# Patient Record
Sex: MALE | Race: WHITE | NOT HISPANIC OR LATINO | Employment: OTHER | URBAN - NONMETROPOLITAN AREA
[De-identification: names, ages, dates, MRNs, and addresses within clinical notes are randomized per-mention and may not be internally consistent; named-entity substitution may affect disease eponyms.]

---

## 2017-01-01 ENCOUNTER — TELEPHONE (OUTPATIENT)
Dept: ONCOLOGY | Facility: CLINIC | Age: 82
End: 2017-01-01

## 2017-01-01 ENCOUNTER — APPOINTMENT (OUTPATIENT)
Dept: GENERAL RADIOLOGY | Facility: HOSPITAL | Age: 82
End: 2017-01-01

## 2017-01-01 ENCOUNTER — TRANSCRIBE ORDERS (OUTPATIENT)
Dept: ADMINISTRATIVE | Facility: HOSPITAL | Age: 82
End: 2017-01-01

## 2017-01-01 ENCOUNTER — LAB REQUISITION (OUTPATIENT)
Dept: LAB | Facility: HOSPITAL | Age: 82
End: 2017-01-01

## 2017-01-01 ENCOUNTER — HOSPITAL ENCOUNTER (OUTPATIENT)
Dept: GENERAL RADIOLOGY | Facility: HOSPITAL | Age: 82
Discharge: HOME | End: 2017-08-01
Attending: INTERNAL MEDICINE | Admitting: INTERNAL MEDICINE

## 2017-01-01 ENCOUNTER — ANESTHESIA EVENT (OUTPATIENT)
Dept: PERIOP | Facility: HOSPITAL | Age: 82
End: 2017-01-01

## 2017-01-01 ENCOUNTER — HOSPITAL ENCOUNTER (EMERGENCY)
Facility: HOSPITAL | Age: 82
Discharge: HOME | End: 2017-07-22
Attending: EMERGENCY MEDICINE | Admitting: EMERGENCY MEDICINE

## 2017-01-01 ENCOUNTER — HOSPITAL ENCOUNTER (INPATIENT)
Facility: HOSPITAL | Age: 82
LOS: 7 days | Discharge: HOME HEALTH | End: 2017-07-07
Attending: EMERGENCY MEDICINE | Admitting: SURGERY

## 2017-01-01 ENCOUNTER — TRANSCRIBE ORDERS (OUTPATIENT)
Dept: LAB | Facility: HOSPITAL | Age: 82
End: 2017-01-01

## 2017-01-01 ENCOUNTER — LAB (OUTPATIENT)
Dept: LAB | Facility: HOSPITAL | Age: 82
End: 2017-01-01

## 2017-01-01 ENCOUNTER — HOSPITAL ENCOUNTER (OUTPATIENT)
Dept: GENERAL RADIOLOGY | Facility: HOSPITAL | Age: 82
Discharge: HOME | End: 2017-06-29
Attending: INTERNAL MEDICINE | Admitting: INTERNAL MEDICINE

## 2017-01-01 ENCOUNTER — OFFICE VISIT (OUTPATIENT)
Dept: ONCOLOGY | Facility: CLINIC | Age: 82
End: 2017-01-01

## 2017-01-01 ENCOUNTER — ANESTHESIA (OUTPATIENT)
Dept: PERIOP | Facility: HOSPITAL | Age: 82
End: 2017-01-01

## 2017-01-01 ENCOUNTER — HOSPITAL ENCOUNTER (OUTPATIENT)
Dept: GENERAL RADIOLOGY | Facility: HOSPITAL | Age: 82
Discharge: HOME | End: 2017-07-10
Attending: INTERNAL MEDICINE | Admitting: INTERNAL MEDICINE

## 2017-01-01 ENCOUNTER — APPOINTMENT (OUTPATIENT)
Dept: CT IMAGING | Facility: HOSPITAL | Age: 82
End: 2017-01-01

## 2017-01-01 ENCOUNTER — INFUSION (OUTPATIENT)
Dept: ONCOLOGY | Facility: HOSPITAL | Age: 82
End: 2017-01-01

## 2017-01-01 VITALS
OXYGEN SATURATION: 100 % | TEMPERATURE: 97.7 F | SYSTOLIC BLOOD PRESSURE: 121 MMHG | HEIGHT: 70 IN | HEART RATE: 74 BPM | DIASTOLIC BLOOD PRESSURE: 74 MMHG | WEIGHT: 180 LBS | RESPIRATION RATE: 16 BRPM | BODY MASS INDEX: 25.77 KG/M2

## 2017-01-01 VITALS
TEMPERATURE: 98.3 F | DIASTOLIC BLOOD PRESSURE: 61 MMHG | SYSTOLIC BLOOD PRESSURE: 116 MMHG | WEIGHT: 185 LBS | HEART RATE: 80 BPM | OXYGEN SATURATION: 98 % | BODY MASS INDEX: 25.06 KG/M2 | RESPIRATION RATE: 18 BRPM | HEIGHT: 72 IN

## 2017-01-01 VITALS
SYSTOLIC BLOOD PRESSURE: 124 MMHG | HEART RATE: 88 BPM | TEMPERATURE: 97.3 F | BODY MASS INDEX: 25.77 KG/M2 | WEIGHT: 180 LBS | DIASTOLIC BLOOD PRESSURE: 60 MMHG | HEIGHT: 70 IN | RESPIRATION RATE: 20 BRPM

## 2017-01-01 VITALS
SYSTOLIC BLOOD PRESSURE: 119 MMHG | TEMPERATURE: 98.7 F | RESPIRATION RATE: 16 BRPM | HEART RATE: 70 BPM | BODY MASS INDEX: 25.87 KG/M2 | HEIGHT: 72 IN | DIASTOLIC BLOOD PRESSURE: 59 MMHG | WEIGHT: 191 LBS

## 2017-01-01 VITALS
RESPIRATION RATE: 13 BRPM | HEART RATE: 68 BPM | SYSTOLIC BLOOD PRESSURE: 104 MMHG | WEIGHT: 192 LBS | BODY MASS INDEX: 27.55 KG/M2 | DIASTOLIC BLOOD PRESSURE: 56 MMHG | TEMPERATURE: 97.8 F

## 2017-01-01 VITALS
WEIGHT: 179 LBS | SYSTOLIC BLOOD PRESSURE: 106 MMHG | HEART RATE: 101 BPM | HEIGHT: 72 IN | RESPIRATION RATE: 16 BRPM | BODY MASS INDEX: 24.24 KG/M2 | TEMPERATURE: 98 F | DIASTOLIC BLOOD PRESSURE: 64 MMHG

## 2017-01-01 VITALS
HEART RATE: 69 BPM | RESPIRATION RATE: 16 BRPM | SYSTOLIC BLOOD PRESSURE: 134 MMHG | WEIGHT: 189 LBS | DIASTOLIC BLOOD PRESSURE: 60 MMHG | BODY MASS INDEX: 25.6 KG/M2 | HEIGHT: 72 IN | TEMPERATURE: 97.1 F

## 2017-01-01 DIAGNOSIS — D75.81 MYELOFIBROSIS (HCC): ICD-10-CM

## 2017-01-01 DIAGNOSIS — D75.81 MYELOFIBROSIS (HCC): Primary | ICD-10-CM

## 2017-01-01 DIAGNOSIS — D72.829 LEUKOCYTOSIS, UNSPECIFIED TYPE: Primary | ICD-10-CM

## 2017-01-01 DIAGNOSIS — R33.9 URINARY RETENTION: Primary | ICD-10-CM

## 2017-01-01 DIAGNOSIS — R14.0 ABDOMINAL DISTENTION: Primary | ICD-10-CM

## 2017-01-01 DIAGNOSIS — R11.2 NAUSEA AND VOMITING, INTRACTABILITY OF VOMITING NOT SPECIFIED, UNSPECIFIED VOMITING TYPE: Primary | ICD-10-CM

## 2017-01-01 DIAGNOSIS — R82.81 PYURIA: ICD-10-CM

## 2017-01-01 DIAGNOSIS — R10.9 ABDOMINAL PAIN, UNSPECIFIED LOCATION: ICD-10-CM

## 2017-01-01 DIAGNOSIS — K56.609 SBO (SMALL BOWEL OBSTRUCTION) (HCC): Primary | ICD-10-CM

## 2017-01-01 DIAGNOSIS — D72.819 LEUKOPENIA, UNSPECIFIED TYPE: ICD-10-CM

## 2017-01-01 DIAGNOSIS — D72.829 LEUKOCYTOSIS, UNSPECIFIED TYPE: ICD-10-CM

## 2017-01-01 DIAGNOSIS — D75.839 THROMBOCYTOSIS: ICD-10-CM

## 2017-01-01 DIAGNOSIS — Z74.09 IMPAIRED FUNCTIONAL MOBILITY, BALANCE, GAIT, AND ENDURANCE: ICD-10-CM

## 2017-01-01 DIAGNOSIS — K55.029 ISCHEMIC NECROSIS OF SMALL BOWEL (HCC): ICD-10-CM

## 2017-01-01 DIAGNOSIS — R11.14 BILIOUS VOMITING WITHOUT NAUSEA: ICD-10-CM

## 2017-01-01 LAB
ABO GROUP BLD: NORMAL
ALBUMIN SERPL-MCNC: 3.3 G/DL (ref 3.2–4.8)
ALBUMIN SERPL-MCNC: 3.6 G/DL (ref 3.2–4.8)
ALBUMIN SERPL-MCNC: 3.7 G/DL (ref 3.2–4.8)
ALBUMIN SERPL-MCNC: 3.7 G/DL (ref 3.2–4.8)
ALBUMIN SERPL-MCNC: 4.9 G/DL (ref 3.2–4.8)
ALBUMIN/GLOB SERPL: 1.7 G/DL (ref 1.5–2.5)
ALBUMIN/GLOB SERPL: 1.8 G/DL (ref 1.5–2.5)
ALBUMIN/GLOB SERPL: 1.9 G/DL (ref 1.5–2.5)
ALBUMIN/GLOB SERPL: 1.9 G/DL (ref 1.5–2.5)
ALP SERPL-CCNC: 32 U/L (ref 25–100)
ALP SERPL-CCNC: 36 U/L (ref 25–100)
ALP SERPL-CCNC: 36 U/L (ref 25–100)
ALP SERPL-CCNC: 41 U/L (ref 25–100)
ALP SERPL-CCNC: 61 U/L (ref 25–100)
ALP SERPL-CCNC: 61 U/L (ref 25–100)
ALT SERPL W P-5'-P-CCNC: 11 U/L (ref 7–40)
ALT SERPL W P-5'-P-CCNC: 20 U/L (ref 7–40)
ALT SERPL W P-5'-P-CCNC: 22 U/L (ref 7–40)
ALT SERPL W P-5'-P-CCNC: 22 U/L (ref 7–40)
ALT SERPL W P-5'-P-CCNC: 25 U/L (ref 7–40)
ALT SERPL W P-5'-P-CCNC: 27 U/L (ref 7–40)
ANION GAP SERPL CALCULATED.3IONS-SCNC: 11 MMOL/L (ref 3–11)
ANION GAP SERPL CALCULATED.3IONS-SCNC: 15 MMOL/L (ref 3–11)
ANION GAP SERPL CALCULATED.3IONS-SCNC: 6 MMOL/L (ref 3–11)
ANION GAP SERPL CALCULATED.3IONS-SCNC: 7 MMOL/L (ref 3–11)
ANION GAP SERPL CALCULATED.3IONS-SCNC: 7 MMOL/L (ref 3–11)
ANION GAP SERPL CALCULATED.3IONS-SCNC: 8 MMOL/L (ref 3–11)
ANION GAP SERPL CALCULATED.3IONS-SCNC: 8 MMOL/L (ref 3–11)
APTT PPP: 35.7 SECONDS (ref 24–31)
AST SERPL-CCNC: 16 U/L (ref 0–33)
AST SERPL-CCNC: 20 U/L (ref 0–33)
AST SERPL-CCNC: 22 U/L (ref 0–33)
AST SERPL-CCNC: 22 U/L (ref 0–33)
AST SERPL-CCNC: 26 U/L (ref 0–33)
AST SERPL-CCNC: 29 U/L (ref 0–33)
AST SERPL-CCNC: 29 U/L (ref 0–33)
AST SERPL-CCNC: 32 U/L (ref 0–33)
AST SERPL-CCNC: 34 U/L (ref 0–33)
BACTERIA SPEC AEROBE CULT: NORMAL
BACTERIA UR QL AUTO: ABNORMAL /HPF
BASOPHILS # BLD AUTO: 0.03 10*3/MM3 (ref 0–0.2)
BASOPHILS # BLD AUTO: 0.03 10*3/MM3 (ref 0–0.2)
BASOPHILS # BLD AUTO: 0.13 10*3/MM3 (ref 0–0.2)
BASOPHILS # BLD MANUAL: 0 10*3/MM3 (ref 0–0.2)
BASOPHILS NFR BLD AUTO: 0 % (ref 0–1)
BASOPHILS NFR BLD AUTO: 0.2 % (ref 0–1)
BASOPHILS NFR BLD AUTO: 0.2 % (ref 0–1)
BASOPHILS NFR BLD AUTO: 0.3 % (ref 0–1)
BILIRUB SERPL-MCNC: 0.4 MG/DL (ref 0.3–1.2)
BILIRUB SERPL-MCNC: 0.6 MG/DL (ref 0.3–1.2)
BILIRUB SERPL-MCNC: 0.7 MG/DL (ref 0.3–1.2)
BILIRUB SERPL-MCNC: 0.8 MG/DL (ref 0.3–1.2)
BILIRUB UR QL STRIP: ABNORMAL
BLASTS NFR BLD MANUAL: 2 % (ref 0–0)
BLASTS NFR BLD MANUAL: 2 % (ref 0–0)
BLD GP AB SCN SERPL QL: NEGATIVE
BUN BLD-MCNC: 36 MG/DL (ref 9–23)
BUN BLD-MCNC: 37 MG/DL (ref 9–23)
BUN BLD-MCNC: 40 MG/DL (ref 9–23)
BUN BLD-MCNC: 42 MG/DL (ref 9–23)
BUN BLD-MCNC: 42 MG/DL (ref 9–23)
BUN BLD-MCNC: 50 MG/DL (ref 9–23)
BUN BLD-MCNC: 52 MG/DL (ref 9–23)
BUN BLD-MCNC: 58 MG/DL (ref 9–23)
BUN/CREAT SERPL: 16.7 (ref 7–25)
BUN/CREAT SERPL: 20.7 (ref 7–25)
BUN/CREAT SERPL: 21.7 (ref 7–25)
BUN/CREAT SERPL: 22.1 (ref 7–25)
BUN/CREAT SERPL: 25.7 (ref 7–25)
BUN/CREAT SERPL: 30 (ref 7–25)
BUN/CREAT SERPL: 30.8 (ref 7–25)
BUN/CREAT SERPL: 30.8 (ref 7–25)
CA-I SERPL ISE-MCNC: 1.08 MMOL/L (ref 1.12–1.32)
CA-I SERPL ISE-MCNC: 1.23 MMOL/L (ref 1.12–1.32)
CA-I SERPL ISE-MCNC: 1.24 MMOL/L (ref 1.12–1.32)
CALCIUM SPEC-SCNC: 10.9 MG/DL (ref 8.7–10.4)
CALCIUM SPEC-SCNC: 9 MG/DL (ref 8.7–10.4)
CALCIUM SPEC-SCNC: 9.1 MG/DL (ref 8.7–10.4)
CALCIUM SPEC-SCNC: 9.2 MG/DL (ref 8.7–10.4)
CALCIUM SPEC-SCNC: 9.7 MG/DL (ref 8.7–10.4)
CALCIUM SPEC-SCNC: 9.8 MG/DL (ref 8.7–10.4)
CHLORIDE SERPL-SCNC: 100 MMOL/L (ref 99–109)
CHLORIDE SERPL-SCNC: 101 MMOL/L (ref 99–109)
CHLORIDE SERPL-SCNC: 103 MMOL/L (ref 99–109)
CHLORIDE SERPL-SCNC: 103 MMOL/L (ref 99–109)
CHLORIDE SERPL-SCNC: 105 MMOL/L (ref 99–109)
CHLORIDE SERPL-SCNC: 105 MMOL/L (ref 99–109)
CHLORIDE SERPL-SCNC: 106 MMOL/L (ref 99–109)
CHLORIDE SERPL-SCNC: 107 MMOL/L (ref 99–109)
CHLORIDE SERPL-SCNC: 108 MMOL/L (ref 99–109)
CHLORIDE SERPL-SCNC: 89 MMOL/L (ref 99–109)
CHOLEST SERPL-MCNC: 46 MG/DL (ref 0–200)
CHOLEST SERPL-MCNC: 48 MG/DL (ref 0–200)
CHOLEST SERPL-MCNC: 50 MG/DL (ref 0–200)
CLARITY UR: ABNORMAL
CO2 SERPL-SCNC: 22 MMOL/L (ref 20–31)
CO2 SERPL-SCNC: 23 MMOL/L (ref 20–31)
CO2 SERPL-SCNC: 23 MMOL/L (ref 20–31)
CO2 SERPL-SCNC: 27 MMOL/L (ref 20–31)
CO2 SERPL-SCNC: 27 MMOL/L (ref 20–31)
CO2 SERPL-SCNC: 28 MMOL/L (ref 20–31)
CO2 SERPL-SCNC: 28 MMOL/L (ref 20–31)
CO2 SERPL-SCNC: 30 MMOL/L (ref 20–31)
CO2 SERPL-SCNC: 31 MMOL/L (ref 20–31)
CO2 SERPL-SCNC: 32 MMOL/L (ref 20–31)
COLOR UR: ABNORMAL
CREAT BLD-MCNC: 1.2 MG/DL (ref 0.6–1.3)
CREAT BLD-MCNC: 1.2 MG/DL (ref 0.6–1.3)
CREAT BLD-MCNC: 1.3 MG/DL (ref 0.6–1.3)
CREAT BLD-MCNC: 1.4 MG/DL (ref 0.6–1.3)
CREAT BLD-MCNC: 1.4 MG/DL (ref 0.6–1.3)
CREAT BLD-MCNC: 1.9 MG/DL (ref 0.6–1.3)
CREAT BLD-MCNC: 1.9 MG/DL (ref 0.6–1.3)
CREAT BLD-MCNC: 2.4 MG/DL (ref 0.6–1.3)
CREAT BLD-MCNC: 2.8 MG/DL (ref 0.6–1.3)
CREAT BLD-MCNC: 3 MG/DL (ref 0.6–1.3)
CRP SERPL-MCNC: 10.45 MG/DL (ref 0–1)
CRP SERPL-MCNC: 10.99 MG/DL (ref 0–1)
CYTO UR: NORMAL
CYTOLOGIST CVX/VAG CYTO: NORMAL
D-LACTATE SERPL-SCNC: 1.4 MMOL/L (ref 0.5–2)
DEPRECATED RDW RBC AUTO: 56.8 FL (ref 37–54)
DEPRECATED RDW RBC AUTO: 56.8 FL (ref 37–54)
DEPRECATED RDW RBC AUTO: 57.1 FL (ref 37–54)
DEPRECATED RDW RBC AUTO: 57.1 FL (ref 37–54)
DEPRECATED RDW RBC AUTO: 57.2 FL (ref 37–54)
DEPRECATED RDW RBC AUTO: 57.7 FL (ref 37–54)
DEPRECATED RDW RBC AUTO: 58.7 FL (ref 37–54)
DEPRECATED RDW RBC AUTO: 59 FL (ref 37–54)
DEPRECATED RDW RBC AUTO: 64.2 FL (ref 37–54)
EOSINOPHIL # BLD AUTO: 0.01 10*3/MM3 (ref 0–0.3)
EOSINOPHIL # BLD AUTO: 0.03 10*3/MM3 (ref 0–0.3)
EOSINOPHIL # BLD AUTO: 0.05 10*3/MM3 (ref 0–0.3)
EOSINOPHIL # BLD MANUAL: 0 10*3/MM3 (ref 0.1–0.3)
EOSINOPHIL NFR BLD AUTO: 0.1 % (ref 0–3)
EOSINOPHIL NFR BLD AUTO: 0.1 % (ref 0–3)
EOSINOPHIL NFR BLD AUTO: 0.2 % (ref 0–3)
EOSINOPHIL NFR BLD MANUAL: 0 % (ref 0–3)
ERYTHROCYTE [DISTWIDTH] IN BLOOD BY AUTOMATED COUNT: 17.6 % (ref 11.3–14.5)
ERYTHROCYTE [DISTWIDTH] IN BLOOD BY AUTOMATED COUNT: 17.7 % (ref 11.3–14.5)
ERYTHROCYTE [DISTWIDTH] IN BLOOD BY AUTOMATED COUNT: 17.8 % (ref 11.3–14.5)
ERYTHROCYTE [DISTWIDTH] IN BLOOD BY AUTOMATED COUNT: 17.8 % (ref 11.3–14.5)
ERYTHROCYTE [DISTWIDTH] IN BLOOD BY AUTOMATED COUNT: 18 % (ref 11.3–14.5)
ERYTHROCYTE [DISTWIDTH] IN BLOOD BY AUTOMATED COUNT: 18.1 % (ref 11.3–14.5)
ERYTHROCYTE [DISTWIDTH] IN BLOOD BY AUTOMATED COUNT: 19.1 % (ref 11.3–14.5)
FINE GRAN CASTS URNS QL MICRO: ABNORMAL /LPF
GFR SERPL CREATININE-BSD FRML MDRD: 20 ML/MIN/1.73
GFR SERPL CREATININE-BSD FRML MDRD: 22 ML/MIN/1.73
GFR SERPL CREATININE-BSD FRML MDRD: 26 ML/MIN/1.73
GFR SERPL CREATININE-BSD FRML MDRD: 34 ML/MIN/1.73
GFR SERPL CREATININE-BSD FRML MDRD: 48 ML/MIN/1.73
GFR SERPL CREATININE-BSD FRML MDRD: 52 ML/MIN/1.73
GFR SERPL CREATININE-BSD FRML MDRD: 57 ML/MIN/1.73
GFR SERPL CREATININE-BSD FRML MDRD: 57 ML/MIN/1.73
GLOBULIN UR ELPH-MCNC: 1.9 GM/DL
GLOBULIN UR ELPH-MCNC: 1.9 GM/DL
GLOBULIN UR ELPH-MCNC: 2 GM/DL
GLOBULIN UR ELPH-MCNC: 2.8 GM/DL
GLUCOSE BLD-MCNC: 104 MG/DL (ref 70–100)
GLUCOSE BLD-MCNC: 112 MG/DL (ref 70–100)
GLUCOSE BLD-MCNC: 114 MG/DL (ref 70–100)
GLUCOSE BLD-MCNC: 117 MG/DL (ref 70–100)
GLUCOSE BLD-MCNC: 120 MG/DL (ref 70–100)
GLUCOSE BLD-MCNC: 120 MG/DL (ref 70–100)
GLUCOSE BLD-MCNC: 145 MG/DL (ref 70–100)
GLUCOSE BLD-MCNC: 174 MG/DL (ref 70–100)
GLUCOSE BLDC GLUCOMTR-MCNC: 104 MG/DL (ref 70–130)
GLUCOSE BLDC GLUCOMTR-MCNC: 108 MG/DL (ref 70–130)
GLUCOSE BLDC GLUCOMTR-MCNC: 110 MG/DL (ref 70–130)
GLUCOSE BLDC GLUCOMTR-MCNC: 110 MG/DL (ref 70–130)
GLUCOSE BLDC GLUCOMTR-MCNC: 112 MG/DL (ref 70–130)
GLUCOSE BLDC GLUCOMTR-MCNC: 113 MG/DL (ref 70–130)
GLUCOSE BLDC GLUCOMTR-MCNC: 114 MG/DL (ref 70–130)
GLUCOSE BLDC GLUCOMTR-MCNC: 115 MG/DL (ref 70–130)
GLUCOSE BLDC GLUCOMTR-MCNC: 117 MG/DL (ref 70–130)
GLUCOSE BLDC GLUCOMTR-MCNC: 121 MG/DL (ref 70–130)
GLUCOSE BLDC GLUCOMTR-MCNC: 122 MG/DL (ref 70–130)
GLUCOSE BLDC GLUCOMTR-MCNC: 123 MG/DL (ref 70–130)
GLUCOSE BLDC GLUCOMTR-MCNC: 132 MG/DL (ref 70–130)
GLUCOSE BLDC GLUCOMTR-MCNC: 137 MG/DL (ref 70–130)
GLUCOSE BLDC GLUCOMTR-MCNC: 137 MG/DL (ref 70–130)
GLUCOSE BLDC GLUCOMTR-MCNC: 138 MG/DL (ref 70–130)
GLUCOSE BLDC GLUCOMTR-MCNC: 158 MG/DL (ref 70–130)
GLUCOSE BLDC GLUCOMTR-MCNC: 75 MG/DL (ref 70–130)
GLUCOSE BLDC GLUCOMTR-MCNC: 87 MG/DL (ref 70–130)
GLUCOSE BLDC GLUCOMTR-MCNC: 91 MG/DL (ref 70–130)
GLUCOSE BLDC GLUCOMTR-MCNC: 94 MG/DL (ref 70–130)
GLUCOSE BLDC GLUCOMTR-MCNC: 98 MG/DL (ref 70–130)
GLUCOSE UR STRIP-MCNC: NEGATIVE MG/DL
HBA1C MFR BLD: 5.7 % (ref 4.8–5.6)
HCT VFR BLD AUTO: 33.8 % (ref 38.9–50.9)
HCT VFR BLD AUTO: 34 % (ref 38.9–50.9)
HCT VFR BLD AUTO: 34.8 % (ref 38.9–50.9)
HCT VFR BLD AUTO: 35.2 % (ref 38.9–50.9)
HCT VFR BLD AUTO: 36.4 % (ref 38.9–50.9)
HCT VFR BLD AUTO: 36.8 % (ref 38.9–50.9)
HCT VFR BLD AUTO: 36.8 % (ref 38.9–50.9)
HCT VFR BLD AUTO: 39.5 % (ref 38.9–50.9)
HCT VFR BLD AUTO: 47 % (ref 38.9–50.9)
HGB BLD-MCNC: 11.2 G/DL (ref 13.1–17.5)
HGB BLD-MCNC: 11.2 G/DL (ref 13.1–17.5)
HGB BLD-MCNC: 11.8 G/DL (ref 13.1–17.5)
HGB BLD-MCNC: 11.8 G/DL (ref 13.1–17.5)
HGB BLD-MCNC: 12.3 G/DL (ref 13.1–17.5)
HGB BLD-MCNC: 12.4 G/DL (ref 13.1–17.5)
HGB BLD-MCNC: 12.4 G/DL (ref 13.1–17.5)
HGB BLD-MCNC: 12.9 G/DL (ref 13.1–17.5)
HGB BLD-MCNC: 16.4 G/DL (ref 13.1–17.5)
HGB UR QL STRIP.AUTO: ABNORMAL
HOLD SPECIMEN: NORMAL
HOLD SPECIMEN: NORMAL
HYALINE CASTS UR QL AUTO: ABNORMAL /LPF
IMM GRANULOCYTES # BLD: 10.69 10*3/MM3 (ref 0–0.03)
IMM GRANULOCYTES # BLD: 2.28 10*3/MM3 (ref 0–0.03)
IMM GRANULOCYTES # BLD: 3.12 10*3/MM3 (ref 0–0.03)
IMM GRANULOCYTES NFR BLD: 14.5 % (ref 0–0.6)
IMM GRANULOCYTES NFR BLD: 19.6 % (ref 0–0.6)
IMM GRANULOCYTES NFR BLD: 26 % (ref 0–0.6)
INR PPP: 1.21
KETONES UR QL STRIP: ABNORMAL
LAB AP CASE REPORT: NORMAL
LAB AP CLINICAL INFORMATION: NORMAL
LAB AP DIAGNOSIS COMMENT: NORMAL
LARGE PLATELETS: ABNORMAL
LARGE PLATELETS: ABNORMAL
LEUKOCYTE ESTERASE UR QL STRIP.AUTO: ABNORMAL
LIPASE SERPL-CCNC: 46 U/L (ref 6–51)
LYMPHOCYTES # BLD AUTO: 1.01 10*3/MM3 (ref 0.6–4.8)
LYMPHOCYTES # BLD AUTO: 1.08 10*3/MM3 (ref 0.6–4.8)
LYMPHOCYTES # BLD AUTO: 2.25 10*3/MM3 (ref 0.6–4.8)
LYMPHOCYTES # BLD MANUAL: 1.64 10*3/MM3 (ref 0.6–4.8)
LYMPHOCYTES # BLD MANUAL: 1.79 10*3/MM3 (ref 0.6–4.8)
LYMPHOCYTES # BLD MANUAL: 2.84 10*3/MM3 (ref 0.6–4.8)
LYMPHOCYTES # BLD MANUAL: 4.42 10*3/MM3 (ref 0.6–4.8)
LYMPHOCYTES # BLD MANUAL: 5.04 10*3/MM3 (ref 0.6–4.8)
LYMPHOCYTES NFR BLD AUTO: 5.5 % (ref 24–44)
LYMPHOCYTES NFR BLD AUTO: 6.3 % (ref 24–44)
LYMPHOCYTES NFR BLD AUTO: 6.9 % (ref 24–44)
LYMPHOCYTES NFR BLD MANUAL: 1 % (ref 0–12)
LYMPHOCYTES NFR BLD MANUAL: 1 % (ref 0–12)
LYMPHOCYTES NFR BLD MANUAL: 14 % (ref 24–44)
LYMPHOCYTES NFR BLD MANUAL: 3 % (ref 0–12)
LYMPHOCYTES NFR BLD MANUAL: 3 % (ref 24–44)
LYMPHOCYTES NFR BLD MANUAL: 4 % (ref 24–44)
LYMPHOCYTES NFR BLD MANUAL: 6 % (ref 0–12)
LYMPHOCYTES NFR BLD MANUAL: 7 % (ref 0–12)
LYMPHOCYTES NFR BLD MANUAL: 8 % (ref 24–44)
LYMPHOCYTES NFR BLD MANUAL: 9 % (ref 24–44)
Lab: NORMAL
MAGNESIUM SERPL-MCNC: 1.6 MG/DL (ref 1.3–2.7)
MAGNESIUM SERPL-MCNC: 1.7 MG/DL (ref 1.3–2.7)
MCH RBC QN AUTO: 30.1 PG (ref 27–31)
MCH RBC QN AUTO: 30.3 PG (ref 27–31)
MCH RBC QN AUTO: 30.6 PG (ref 27–31)
MCH RBC QN AUTO: 30.7 PG (ref 27–31)
MCH RBC QN AUTO: 30.8 PG (ref 27–31)
MCH RBC QN AUTO: 31.3 PG (ref 27–31)
MCH RBC QN AUTO: 31.5 PG (ref 27–31)
MCHC RBC AUTO-ENTMCNC: 32.7 G/DL (ref 32–36)
MCHC RBC AUTO-ENTMCNC: 32.9 G/DL (ref 32–36)
MCHC RBC AUTO-ENTMCNC: 33.1 G/DL (ref 32–36)
MCHC RBC AUTO-ENTMCNC: 33.5 G/DL (ref 32–36)
MCHC RBC AUTO-ENTMCNC: 33.7 G/DL (ref 32–36)
MCHC RBC AUTO-ENTMCNC: 33.7 G/DL (ref 32–36)
MCHC RBC AUTO-ENTMCNC: 33.8 G/DL (ref 32–36)
MCHC RBC AUTO-ENTMCNC: 33.9 G/DL (ref 32–36)
MCHC RBC AUTO-ENTMCNC: 34.9 G/DL (ref 32–36)
MCV RBC AUTO: 89.3 FL (ref 80–99)
MCV RBC AUTO: 90.2 FL (ref 80–99)
MCV RBC AUTO: 90.5 FL (ref 80–99)
MCV RBC AUTO: 90.8 FL (ref 80–99)
MCV RBC AUTO: 90.9 FL (ref 80–99)
MCV RBC AUTO: 91.4 FL (ref 80–99)
MCV RBC AUTO: 95.9 FL (ref 80–99)
METAMYELOCYTES NFR BLD MANUAL: 1 % (ref 0–0)
METAMYELOCYTES NFR BLD MANUAL: 11 % (ref 0–0)
METAMYELOCYTES NFR BLD MANUAL: 12 % (ref 0–0)
METAMYELOCYTES NFR BLD MANUAL: 2 % (ref 0–0)
METAMYELOCYTES NFR BLD MANUAL: 8 % (ref 0–0)
MONOCYTES # BLD AUTO: 0.32 10*3/MM3 (ref 0–1)
MONOCYTES # BLD AUTO: 0.63 10*3/MM3 (ref 0–1)
MONOCYTES # BLD AUTO: 0.95 10*3/MM3 (ref 0–1)
MONOCYTES # BLD AUTO: 2.17 10*3/MM3 (ref 0–1)
MONOCYTES # BLD AUTO: 2.63 10*3/MM3 (ref 0–1)
MONOCYTES # BLD AUTO: 2.87 10*3/MM3 (ref 0–1)
MONOCYTES # BLD AUTO: 3.35 10*3/MM3 (ref 0–1)
MONOCYTES # BLD AUTO: 3.58 10*3/MM3 (ref 0–1)
MONOCYTES NFR BLD AUTO: 13.6 % (ref 0–12)
MONOCYTES NFR BLD AUTO: 16.8 % (ref 0–12)
MONOCYTES NFR BLD AUTO: 8.2 % (ref 0–12)
MYELOCYTES NFR BLD MANUAL: 0 % (ref 0–0)
MYELOCYTES NFR BLD MANUAL: 3 % (ref 0–0)
MYELOCYTES NFR BLD MANUAL: 3 % (ref 0–0)
MYELOCYTES NFR BLD MANUAL: 5 % (ref 0–0)
NEUTROPHILS # BLD AUTO: 21.17 10*3/MM3 (ref 1.5–8.3)
NEUTROPHILS # BLD AUTO: 21.47 10*3/MM3 (ref 1.5–8.3)
NEUTROPHILS # BLD AUTO: 24.58 10*3/MM3 (ref 1.5–8.3)
NEUTROPHILS # BLD AUTO: 30.38 10*3/MM3 (ref 1.5–8.3)
NEUTROPHILS # BLD AUTO: 52.54 10*3/MM3 (ref 1.5–8.3)
NEUTROPHILS # BLD AUTO: 56.09 10*3/MM3 (ref 1.5–8.3)
NEUTROPHILS # BLD AUTO: 9.55 10*3/MM3 (ref 1.5–8.3)
NEUTROPHILS # BLD AUTO: 9.65 10*3/MM3 (ref 1.5–8.3)
NEUTROPHILS NFR BLD AUTO: 59.9 % (ref 41–71)
NEUTROPHILS NFR BLD AUTO: 60.1 % (ref 41–71)
NEUTROPHILS NFR BLD AUTO: 61.5 % (ref 41–71)
NEUTROPHILS NFR BLD MANUAL: 56 % (ref 41–71)
NEUTROPHILS NFR BLD MANUAL: 58 % (ref 41–71)
NEUTROPHILS NFR BLD MANUAL: 63 % (ref 41–71)
NEUTROPHILS NFR BLD MANUAL: 74 % (ref 41–71)
NEUTROPHILS NFR BLD MANUAL: 74 % (ref 41–71)
NEUTS BAND NFR BLD MANUAL: 10 % (ref 0–5)
NEUTS BAND NFR BLD MANUAL: 11 % (ref 0–5)
NEUTS BAND NFR BLD MANUAL: 11 % (ref 0–5)
NEUTS BAND NFR BLD MANUAL: 14 % (ref 0–5)
NEUTS BAND NFR BLD MANUAL: 15 % (ref 0–5)
NITRITE UR QL STRIP: NEGATIVE
NRBC BLD MANUAL-RTO: 0 /100 WBC (ref 0–0)
PATH INTERP BLD-IMP: NORMAL
PATH REPORT.FINAL DX SPEC: NORMAL
PATH REPORT.GROSS SPEC: NORMAL
PH UR STRIP.AUTO: <=5 [PH] (ref 5–8)
PHOSPHATE SERPL-MCNC: 2.3 MG/DL (ref 2.4–5.1)
PHOSPHATE SERPL-MCNC: 2.3 MG/DL (ref 2.4–5.1)
PHOSPHATE SERPL-MCNC: 2.4 MG/DL (ref 2.4–5.1)
PHOSPHATE SERPL-MCNC: 2.4 MG/DL (ref 2.4–5.1)
PHOSPHATE SERPL-MCNC: 2.5 MG/DL (ref 2.4–5.1)
PHOSPHATE SERPL-MCNC: 2.8 MG/DL (ref 2.4–5.1)
PHOSPHATE SERPL-MCNC: 2.8 MG/DL (ref 2.4–5.1)
PHOSPHATE SERPL-MCNC: 3.5 MG/DL (ref 2.4–5.1)
PHOSPHATE SERPL-MCNC: 3.8 MG/DL (ref 2.4–5.1)
PLAT MORPH BLD: NORMAL
PLATELET # BLD AUTO: 142 10*3/MM3 (ref 150–450)
PLATELET # BLD AUTO: 46 10*3/MM3 (ref 150–450)
PLATELET # BLD AUTO: 46 10*3/MM3 (ref 150–450)
PLATELET # BLD AUTO: 50 10*3/MM3 (ref 150–450)
PLATELET # BLD AUTO: 55 10*3/MM3 (ref 150–450)
PLATELET # BLD AUTO: 55 10*3/MM3 (ref 150–450)
PLATELET # BLD AUTO: 64 10*3/MM3 (ref 150–450)
PLATELET # BLD AUTO: 75 10*3/MM3 (ref 150–450)
PLATELET # BLD AUTO: 84 10*3/MM3 (ref 150–450)
PMV BLD AUTO: ABNORMAL FL (ref 6–12)
POTASSIUM BLD-SCNC: 3.5 MMOL/L (ref 3.5–5.5)
POTASSIUM BLD-SCNC: 3.7 MMOL/L (ref 3.5–5.5)
POTASSIUM BLD-SCNC: 3.8 MMOL/L (ref 3.5–5.5)
POTASSIUM BLD-SCNC: 3.9 MMOL/L (ref 3.5–5.5)
POTASSIUM BLD-SCNC: 3.9 MMOL/L (ref 3.5–5.5)
POTASSIUM BLD-SCNC: 4 MMOL/L (ref 3.5–5.5)
POTASSIUM BLD-SCNC: 4.1 MMOL/L (ref 3.5–5.5)
PREALB SERPL-MCNC: 15.7 MG/DL (ref 10–40)
PREALB SERPL-MCNC: 16 MG/DL (ref 10–40)
PREALB SERPL-MCNC: 8.9 MG/DL (ref 10–40)
PROCALCITONIN SERPL-MCNC: 0.06 NG/ML
PROCALCITONIN SERPL-MCNC: 0.22 NG/ML
PROCALCITONIN SERPL-MCNC: 0.65 NG/ML
PROCALCITONIN SERPL-MCNC: 5.28 NG/ML
PROCALCITONIN SERPL-MCNC: 5.97 NG/ML
PROMYELOCYTES NFR BLD MANUAL: 1 % (ref 0–0)
PROT SERPL-MCNC: 5.2 G/DL (ref 5.7–8.2)
PROT SERPL-MCNC: 5.2 G/DL (ref 5.7–8.2)
PROT SERPL-MCNC: 5.3 G/DL (ref 5.7–8.2)
PROT SERPL-MCNC: 5.5 G/DL (ref 5.7–8.2)
PROT SERPL-MCNC: 5.7 G/DL (ref 5.7–8.2)
PROT SERPL-MCNC: 7.7 G/DL (ref 5.7–8.2)
PROT UR QL STRIP: ABNORMAL
PROTHROMBIN TIME: 13.3 SECONDS (ref 9.6–11.5)
RBC # BLD AUTO: 3.72 10*6/MM3 (ref 4.2–5.76)
RBC # BLD AUTO: 3.72 10*6/MM3 (ref 4.2–5.76)
RBC # BLD AUTO: 3.83 10*6/MM3 (ref 4.2–5.76)
RBC # BLD AUTO: 3.89 10*6/MM3 (ref 4.2–5.76)
RBC # BLD AUTO: 4.01 10*6/MM3 (ref 4.2–5.76)
RBC # BLD AUTO: 4.05 10*6/MM3 (ref 4.2–5.76)
RBC # BLD AUTO: 4.12 10*6/MM3 (ref 4.2–5.76)
RBC # BLD AUTO: 4.12 10*6/MM3 (ref 4.2–5.76)
RBC # BLD AUTO: 5.21 10*6/MM3 (ref 4.2–5.76)
RBC # UR: ABNORMAL /HPF
RBC MORPH BLD: NORMAL
REF LAB TEST METHOD: ABNORMAL
RH BLD: POSITIVE
SODIUM BLD-SCNC: 135 MMOL/L (ref 132–146)
SODIUM BLD-SCNC: 136 MMOL/L (ref 132–146)
SODIUM BLD-SCNC: 137 MMOL/L (ref 132–146)
SODIUM BLD-SCNC: 138 MMOL/L (ref 132–146)
SODIUM BLD-SCNC: 139 MMOL/L (ref 132–146)
SODIUM BLD-SCNC: 139 MMOL/L (ref 132–146)
SODIUM BLD-SCNC: 140 MMOL/L (ref 132–146)
SODIUM BLD-SCNC: 140 MMOL/L (ref 132–146)
SP GR UR STRIP: 1.02 (ref 1–1.03)
SQUAMOUS #/AREA URNS HPF: ABNORMAL /HPF
TRIGL SERPL-MCNC: 26 MG/DL (ref 0–150)
TRIGL SERPL-MCNC: 50 MG/DL (ref 0–150)
TRIGL SERPL-MCNC: 74 MG/DL (ref 0–150)
UROBILINOGEN UR QL STRIP: ABNORMAL
VARIANT LYMPHS NFR BLD MANUAL: 1 % (ref 0–5)
VARIANT LYMPHS NFR BLD MANUAL: 2 % (ref 0–5)
VARIANT LYMPHS NFR BLD MANUAL: 5 % (ref 0–5)
WBC MORPH BLD: NORMAL
WBC NRBC COR # BLD: 15.68 10*3/MM3 (ref 3.5–10.8)
WBC NRBC COR # BLD: 15.91 10*3/MM3 (ref 3.5–10.8)
WBC NRBC COR # BLD: 29.62 10*3/MM3 (ref 3.5–10.8)
WBC NRBC COR # BLD: 31.58 10*3/MM3 (ref 3.5–10.8)
WBC NRBC COR # BLD: 31.6 10*3/MM3 (ref 3.5–10.8)
WBC NRBC COR # BLD: 41.05 10*3/MM3 (ref 3.5–10.8)
WBC NRBC COR # BLD: 59.71 10*3/MM3 (ref 3.5–10.8)
WBC NRBC COR # BLD: 60.03 10*3/MM3 (ref 3.5–10.8)
WBC NRBC COR # BLD: 63.02 10*3/MM3 (ref 3.5–10.8)
WBC UR QL AUTO: ABNORMAL /HPF
WHOLE BLOOD HOLD SPECIMEN: NORMAL
WHOLE BLOOD HOLD SPECIMEN: NORMAL

## 2017-01-01 PROCEDURE — 25010000002 PHENYLEPHRINE PER 1 ML: Performed by: NURSE ANESTHETIST, CERTIFIED REGISTERED

## 2017-01-01 PROCEDURE — 85025 COMPLETE CBC W/AUTO DIFF WBC: CPT | Performed by: INTERNAL MEDICINE

## 2017-01-01 PROCEDURE — 80053 COMPREHEN METABOLIC PANEL: CPT | Performed by: SURGERY

## 2017-01-01 PROCEDURE — 84145 PROCALCITONIN (PCT): CPT | Performed by: INTERNAL MEDICINE

## 2017-01-01 PROCEDURE — 99232 SBSQ HOSP IP/OBS MODERATE 35: CPT | Performed by: INTERNAL MEDICINE

## 2017-01-01 PROCEDURE — 25010000002 THIAMINE PER 100 MG: Performed by: INTERNAL MEDICINE

## 2017-01-01 PROCEDURE — 25010000002 PIPERACILLIN SOD-TAZOBACTAM PER 1 G

## 2017-01-01 PROCEDURE — 25010000002 FENTANYL CITRATE (PF) 100 MCG/2ML SOLUTION: Performed by: NURSE PRACTITIONER

## 2017-01-01 PROCEDURE — 87040 BLOOD CULTURE FOR BACTERIA: CPT | Performed by: INTERNAL MEDICINE

## 2017-01-01 PROCEDURE — 74176 CT ABD & PELVIS W/O CONTRAST: CPT

## 2017-01-01 PROCEDURE — 82330 ASSAY OF CALCIUM: CPT | Performed by: SURGERY

## 2017-01-01 PROCEDURE — 83735 ASSAY OF MAGNESIUM: CPT | Performed by: NURSE PRACTITIONER

## 2017-01-01 PROCEDURE — 85025 COMPLETE CBC W/AUTO DIFF WBC: CPT | Performed by: SURGERY

## 2017-01-01 PROCEDURE — 25010000002 HEPARIN (PORCINE) PER 1000 UNITS: Performed by: INTERNAL MEDICINE

## 2017-01-01 PROCEDURE — 86140 C-REACTIVE PROTEIN: CPT | Performed by: SURGERY

## 2017-01-01 PROCEDURE — 84478 ASSAY OF TRIGLYCERIDES: CPT | Performed by: SURGERY

## 2017-01-01 PROCEDURE — 83735 ASSAY OF MAGNESIUM: CPT | Performed by: SURGERY

## 2017-01-01 PROCEDURE — 25010000002 PIPERACILLIN-TAZOBACTAM: Performed by: SURGERY

## 2017-01-01 PROCEDURE — 25010000002 ONDANSETRON PER 1 MG: Performed by: INTERNAL MEDICINE

## 2017-01-01 PROCEDURE — 25010000002 FONDAPARINUX PER 0.5 MG: Performed by: INTERNAL MEDICINE

## 2017-01-01 PROCEDURE — 25010000002 BUPRENORPHINE PER 0.1 MG: Performed by: NURSE ANESTHETIST, CERTIFIED REGISTERED

## 2017-01-01 PROCEDURE — 25010000002 MAGNESIUM SULFATE PER 500 MG OF MAGNESIUM

## 2017-01-01 PROCEDURE — 25010000002 MAGNESIUM SULFATE PER 500 MG OF MAGNESIUM: Performed by: SURGERY

## 2017-01-01 PROCEDURE — 80053 COMPREHEN METABOLIC PANEL: CPT | Performed by: NURSE PRACTITIONER

## 2017-01-01 PROCEDURE — 71010 HC CHEST PA OR AP: CPT

## 2017-01-01 PROCEDURE — 99233 SBSQ HOSP IP/OBS HIGH 50: CPT | Performed by: INTERNAL MEDICINE

## 2017-01-01 PROCEDURE — 99283 EMERGENCY DEPT VISIT LOW MDM: CPT

## 2017-01-01 PROCEDURE — 99284 EMERGENCY DEPT VISIT MOD MDM: CPT

## 2017-01-01 PROCEDURE — 25010000002 POTASSIUM CHLORIDE PER 2 MEQ OF POTASSIUM

## 2017-01-01 PROCEDURE — 81001 URINALYSIS AUTO W/SCOPE: CPT | Performed by: NURSE PRACTITIONER

## 2017-01-01 PROCEDURE — 86901 BLOOD TYPING SEROLOGIC RH(D): CPT | Performed by: SURGERY

## 2017-01-01 PROCEDURE — 99214 OFFICE O/P EST MOD 30 MIN: CPT | Performed by: INTERNAL MEDICINE

## 2017-01-01 PROCEDURE — 80053 COMPREHEN METABOLIC PANEL: CPT

## 2017-01-01 PROCEDURE — 71020 HC CHEST PA AND LATERAL: CPT

## 2017-01-01 PROCEDURE — C1751 CATH, INF, PER/CENT/MIDLINE: HCPCS | Performed by: ANESTHESIOLOGY

## 2017-01-01 PROCEDURE — 84100 ASSAY OF PHOSPHORUS: CPT

## 2017-01-01 PROCEDURE — 25010000002 PROPOFOL 10 MG/ML EMULSION: Performed by: NURSE ANESTHETIST, CERTIFIED REGISTERED

## 2017-01-01 PROCEDURE — P9046 ALBUMIN (HUMAN), 25%, 20 ML: HCPCS | Performed by: NURSE ANESTHETIST, CERTIFIED REGISTERED

## 2017-01-01 PROCEDURE — 85007 BL SMEAR W/DIFF WBC COUNT: CPT | Performed by: INTERNAL MEDICINE

## 2017-01-01 PROCEDURE — 80053 COMPREHEN METABOLIC PANEL: CPT | Performed by: INTERNAL MEDICINE

## 2017-01-01 PROCEDURE — 74020 HC XR ABDOMEN FLAT & UPRIGHT: CPT

## 2017-01-01 PROCEDURE — 25010000002 MAGNESIUM SULFATE PER 500 MG OF MAGNESIUM: Performed by: INTERNAL MEDICINE

## 2017-01-01 PROCEDURE — 84132 ASSAY OF SERUM POTASSIUM: CPT | Performed by: NURSE PRACTITIONER

## 2017-01-01 PROCEDURE — C1894 INTRO/SHEATH, NON-LASER: HCPCS

## 2017-01-01 PROCEDURE — 84478 ASSAY OF TRIGLYCERIDES: CPT

## 2017-01-01 PROCEDURE — 86140 C-REACTIVE PROTEIN: CPT

## 2017-01-01 PROCEDURE — 25010000002 CALCIUM GLUCONATE PER 10 ML: Performed by: SURGERY

## 2017-01-01 PROCEDURE — 85610 PROTHROMBIN TIME: CPT | Performed by: INTERNAL MEDICINE

## 2017-01-01 PROCEDURE — 83735 ASSAY OF MAGNESIUM: CPT

## 2017-01-01 PROCEDURE — 25010000002 CALCIUM GLUCONATE PER 10 ML

## 2017-01-01 PROCEDURE — 25810000003 POTASSIUM CHLORIDE PER 2 MEQ: Performed by: SURGERY

## 2017-01-01 PROCEDURE — 84134 ASSAY OF PREALBUMIN: CPT

## 2017-01-01 PROCEDURE — 84100 ASSAY OF PHOSPHORUS: CPT | Performed by: SURGERY

## 2017-01-01 PROCEDURE — 94799 UNLISTED PULMONARY SVC/PX: CPT

## 2017-01-01 PROCEDURE — 83605 ASSAY OF LACTIC ACID: CPT | Performed by: INTERNAL MEDICINE

## 2017-01-01 PROCEDURE — 83605 ASSAY OF LACTIC ACID: CPT | Performed by: NURSE PRACTITIONER

## 2017-01-01 PROCEDURE — 82465 ASSAY BLD/SERUM CHOLESTEROL: CPT | Performed by: SURGERY

## 2017-01-01 PROCEDURE — 84134 ASSAY OF PREALBUMIN: CPT | Performed by: SURGERY

## 2017-01-01 PROCEDURE — 85025 COMPLETE CBC W/AUTO DIFF WBC: CPT | Performed by: NURSE PRACTITIONER

## 2017-01-01 PROCEDURE — 96360 HYDRATION IV INFUSION INIT: CPT

## 2017-01-01 PROCEDURE — 25010000003 POTASSIUM CHLORIDE PER 2 MEQ

## 2017-01-01 PROCEDURE — 25010000002 DIAZEPAM PER 5 MG: Performed by: INTERNAL MEDICINE

## 2017-01-01 PROCEDURE — 97161 PT EVAL LOW COMPLEX 20 MIN: CPT

## 2017-01-01 PROCEDURE — 85060 BLOOD SMEAR INTERPRETATION: CPT | Performed by: INTERNAL MEDICINE

## 2017-01-01 PROCEDURE — 0DN80ZZ RELEASE SMALL INTESTINE, OPEN APPROACH: ICD-10-PCS | Performed by: SURGERY

## 2017-01-01 PROCEDURE — 82962 GLUCOSE BLOOD TEST: CPT

## 2017-01-01 PROCEDURE — 82465 ASSAY BLD/SERUM CHOLESTEROL: CPT

## 2017-01-01 PROCEDURE — 25010000002 ONDANSETRON PER 1 MG: Performed by: NURSE ANESTHETIST, CERTIFIED REGISTERED

## 2017-01-01 PROCEDURE — 25010000002 POTASSIUM CHLORIDE PER 2 MEQ OF POTASSIUM: Performed by: SURGERY

## 2017-01-01 PROCEDURE — 51702 INSERT TEMP BLADDER CATH: CPT

## 2017-01-01 PROCEDURE — 70490 CT SOFT TISSUE NECK W/O DYE: CPT

## 2017-01-01 PROCEDURE — 3E0436Z INTRODUCTION OF NUTRITIONAL SUBSTANCE INTO CENTRAL VEIN, PERCUTANEOUS APPROACH: ICD-10-PCS | Performed by: INTERNAL MEDICINE

## 2017-01-01 PROCEDURE — 80069 RENAL FUNCTION PANEL: CPT | Performed by: SURGERY

## 2017-01-01 PROCEDURE — 84100 ASSAY OF PHOSPHORUS: CPT | Performed by: INTERNAL MEDICINE

## 2017-01-01 PROCEDURE — 85007 BL SMEAR W/DIFF WBC COUNT: CPT | Performed by: NURSE PRACTITIONER

## 2017-01-01 PROCEDURE — 85027 COMPLETE CBC AUTOMATED: CPT | Performed by: INTERNAL MEDICINE

## 2017-01-01 PROCEDURE — 87086 URINE CULTURE/COLONY COUNT: CPT | Performed by: EMERGENCY MEDICINE

## 2017-01-01 PROCEDURE — 25010000002 MAGNESIUM SULFATE 2 GM/50ML SOLUTION: Performed by: INTERNAL MEDICINE

## 2017-01-01 PROCEDURE — 86850 RBC ANTIBODY SCREEN: CPT | Performed by: SURGERY

## 2017-01-01 PROCEDURE — 25010000002 PIPERACILLIN-TAZOBACTAM

## 2017-01-01 PROCEDURE — 71250 CT THORAX DX C-: CPT

## 2017-01-01 PROCEDURE — 25010000002 POTASSIUM CHLORIDE PER 2 MEQ OF POTASSIUM: Performed by: INTERNAL MEDICINE

## 2017-01-01 PROCEDURE — 96374 THER/PROPH/DIAG INJ IV PUSH: CPT

## 2017-01-01 PROCEDURE — 99291 CRITICAL CARE FIRST HOUR: CPT | Performed by: INTERNAL MEDICINE

## 2017-01-01 PROCEDURE — 36415 COLL VENOUS BLD VENIPUNCTURE: CPT

## 2017-01-01 PROCEDURE — 02HV33Z INSERTION OF INFUSION DEVICE INTO SUPERIOR VENA CAVA, PERCUTANEOUS APPROACH: ICD-10-PCS | Performed by: INTERNAL MEDICINE

## 2017-01-01 PROCEDURE — 25010000002 NEOSTIGMINE 10 MG/10ML SOLUTION: Performed by: NURSE ANESTHETIST, CERTIFIED REGISTERED

## 2017-01-01 PROCEDURE — 96361 HYDRATE IV INFUSION ADD-ON: CPT

## 2017-01-01 PROCEDURE — 93005 ELECTROCARDIOGRAM TRACING: CPT | Performed by: SURGERY

## 2017-01-01 PROCEDURE — 25010000002 FENTANYL CITRATE (PF) 100 MCG/2ML SOLUTION: Performed by: NURSE ANESTHETIST, CERTIFIED REGISTERED

## 2017-01-01 PROCEDURE — 83735 ASSAY OF MAGNESIUM: CPT | Performed by: INTERNAL MEDICINE

## 2017-01-01 PROCEDURE — 25010000002 DEXAMETHASONE SODIUM PHOSPHATE 10 MG/ML SOLUTION 1 ML VIAL: Performed by: NURSE ANESTHETIST, CERTIFIED REGISTERED

## 2017-01-01 PROCEDURE — 85007 BL SMEAR W/DIFF WBC COUNT: CPT | Performed by: SURGERY

## 2017-01-01 PROCEDURE — 83036 HEMOGLOBIN GLYCOSYLATED A1C: CPT | Performed by: INTERNAL MEDICINE

## 2017-01-01 PROCEDURE — 85730 THROMBOPLASTIN TIME PARTIAL: CPT | Performed by: INTERNAL MEDICINE

## 2017-01-01 PROCEDURE — 96365 THER/PROPH/DIAG IV INF INIT: CPT

## 2017-01-01 PROCEDURE — 25010000002 CALCIUM GLUCONATE PER 10 ML: Performed by: INTERNAL MEDICINE

## 2017-01-01 PROCEDURE — 87086 URINE CULTURE/COLONY COUNT: CPT | Performed by: NURSE PRACTITIONER

## 2017-01-01 PROCEDURE — 99254 IP/OBS CNSLTJ NEW/EST MOD 60: CPT | Performed by: INTERNAL MEDICINE

## 2017-01-01 PROCEDURE — C1751 CATH, INF, PER/CENT/MIDLINE: HCPCS

## 2017-01-01 PROCEDURE — 83690 ASSAY OF LIPASE: CPT | Performed by: NURSE PRACTITIONER

## 2017-01-01 PROCEDURE — 85027 COMPLETE CBC AUTOMATED: CPT | Performed by: SURGERY

## 2017-01-01 PROCEDURE — 25010000002 ALBUMIN HUMAN 25% PER 50 ML: Performed by: NURSE ANESTHETIST, CERTIFIED REGISTERED

## 2017-01-01 PROCEDURE — 74000 HC ABDOMEN KUB: CPT

## 2017-01-01 PROCEDURE — 86900 BLOOD TYPING SEROLOGIC ABO: CPT | Performed by: SURGERY

## 2017-01-01 RX ORDER — MAGNESIUM SULFATE 1 G/100ML
1 INJECTION INTRAVENOUS AS NEEDED
Status: DISCONTINUED | OUTPATIENT
Start: 2017-01-01 | End: 2017-01-01 | Stop reason: HOSPADM

## 2017-01-01 RX ORDER — PANTOPRAZOLE SODIUM 40 MG/10ML
40 INJECTION, POWDER, LYOPHILIZED, FOR SOLUTION INTRAVENOUS
Status: DISCONTINUED | OUTPATIENT
Start: 2017-01-01 | End: 2017-01-01 | Stop reason: HOSPADM

## 2017-01-01 RX ORDER — SUCRALFATE 1 G/1
TABLET ORAL
Refills: 3 | Status: ON HOLD | COMMUNITY
Start: 2017-01-09 | End: 2017-01-01

## 2017-01-01 RX ORDER — DOCUSATE SODIUM 100 MG/1
100 CAPSULE, LIQUID FILLED ORAL 2 TIMES DAILY
Status: DISCONTINUED | OUTPATIENT
Start: 2017-01-01 | End: 2017-01-01 | Stop reason: HOSPADM

## 2017-01-01 RX ORDER — ALBUMIN (HUMAN) 12.5 G/50ML
SOLUTION INTRAVENOUS CONTINUOUS PRN
Status: DISCONTINUED | OUTPATIENT
Start: 2017-01-01 | End: 2017-01-01 | Stop reason: SURG

## 2017-01-01 RX ORDER — SODIUM CHLORIDE 9 MG/ML
INJECTION, SOLUTION INTRAVENOUS CONTINUOUS PRN
Status: DISCONTINUED | OUTPATIENT
Start: 2017-01-01 | End: 2017-01-01 | Stop reason: SURG

## 2017-01-01 RX ORDER — ESZOPICLONE 3 MG/1
TABLET, FILM COATED ORAL
Refills: 3 | Status: ON HOLD | COMMUNITY
Start: 2017-02-22 | End: 2017-01-01

## 2017-01-01 RX ORDER — DIAZEPAM 5 MG/ML
2.5 INJECTION, SOLUTION INTRAMUSCULAR; INTRAVENOUS EVERY 4 HOURS PRN
Status: DISCONTINUED | OUTPATIENT
Start: 2017-01-01 | End: 2017-01-01 | Stop reason: HOSPADM

## 2017-01-01 RX ORDER — ONDANSETRON 2 MG/ML
INJECTION INTRAMUSCULAR; INTRAVENOUS AS NEEDED
Status: DISCONTINUED | OUTPATIENT
Start: 2017-01-01 | End: 2017-01-01 | Stop reason: SURG

## 2017-01-01 RX ORDER — SODIUM CHLORIDE, SODIUM LACTATE, POTASSIUM CHLORIDE, CALCIUM CHLORIDE 600; 310; 30; 20 MG/100ML; MG/100ML; MG/100ML; MG/100ML
70 INJECTION, SOLUTION INTRAVENOUS CONTINUOUS
Status: DISCONTINUED | OUTPATIENT
Start: 2017-01-01 | End: 2017-01-01

## 2017-01-01 RX ORDER — CALCIUM CARB/VITAMIN D3/VIT K1 500-100-40
TABLET,CHEWABLE ORAL
Refills: 3 | Status: ON HOLD | COMMUNITY
Start: 2017-03-11 | End: 2017-01-01

## 2017-01-01 RX ORDER — MAGNESIUM SULFATE HEPTAHYDRATE 40 MG/ML
4 INJECTION, SOLUTION INTRAVENOUS AS NEEDED
Status: DISCONTINUED | OUTPATIENT
Start: 2017-01-01 | End: 2017-01-01 | Stop reason: HOSPADM

## 2017-01-01 RX ORDER — MAGNESIUM SULFATE HEPTAHYDRATE 40 MG/ML
2 INJECTION, SOLUTION INTRAVENOUS AS NEEDED
Status: DISCONTINUED | OUTPATIENT
Start: 2017-01-01 | End: 2017-01-01 | Stop reason: HOSPADM

## 2017-01-01 RX ORDER — LIDOCAINE HYDROCHLORIDE 10 MG/ML
0.5 INJECTION, SOLUTION EPIDURAL; INFILTRATION; INTRACAUDAL; PERINEURAL ONCE AS NEEDED
Status: DISCONTINUED | OUTPATIENT
Start: 2017-01-01 | End: 2017-01-01 | Stop reason: HOSPADM

## 2017-01-01 RX ORDER — DANAZOL 50 MG/1
CAPSULE ORAL
Refills: 0 | COMMUNITY
Start: 2017-03-01

## 2017-01-01 RX ORDER — NALOXONE HCL 0.4 MG/ML
0.4 VIAL (ML) INJECTION
Status: DISCONTINUED | OUTPATIENT
Start: 2017-01-01 | End: 2017-01-01 | Stop reason: HOSPADM

## 2017-01-01 RX ORDER — FAMOTIDINE 20 MG/1
20 TABLET, FILM COATED ORAL
Status: DISCONTINUED | OUTPATIENT
Start: 2017-01-01 | End: 2017-01-01 | Stop reason: HOSPADM

## 2017-01-01 RX ORDER — CEFDINIR 300 MG/1
CAPSULE ORAL
Refills: 0 | COMMUNITY
Start: 2017-01-01

## 2017-01-01 RX ORDER — ONDANSETRON 2 MG/ML
4 INJECTION INTRAMUSCULAR; INTRAVENOUS EVERY 6 HOURS PRN
Status: DISCONTINUED | OUTPATIENT
Start: 2017-01-01 | End: 2017-01-01 | Stop reason: HOSPADM

## 2017-01-01 RX ORDER — METHYLPREDNISOLONE 4 MG/1
TABLET ORAL
Refills: 0 | COMMUNITY
Start: 2017-01-01 | End: 2017-01-01 | Stop reason: HOSPADM

## 2017-01-01 RX ORDER — FINASTERIDE 5 MG/1
TABLET, FILM COATED ORAL
Refills: 1 | COMMUNITY
Start: 2017-01-01 | End: 2017-01-01 | Stop reason: HOSPADM

## 2017-01-01 RX ORDER — PROPOFOL 10 MG/ML
VIAL (ML) INTRAVENOUS AS NEEDED
Status: DISCONTINUED | OUTPATIENT
Start: 2017-01-01 | End: 2017-01-01 | Stop reason: SURG

## 2017-01-01 RX ORDER — FENTANYL CITRATE 50 UG/ML
50 INJECTION, SOLUTION INTRAMUSCULAR; INTRAVENOUS
Status: DISCONTINUED | OUTPATIENT
Start: 2017-01-01 | End: 2017-01-01

## 2017-01-01 RX ORDER — SODIUM CHLORIDE 0.9 % (FLUSH) 0.9 %
10 SYRINGE (ML) INJECTION AS NEEDED
Status: DISCONTINUED | OUTPATIENT
Start: 2017-01-01 | End: 2017-01-01 | Stop reason: HOSPADM

## 2017-01-01 RX ORDER — HEPARIN SODIUM 5000 [USP'U]/ML
5000 INJECTION, SOLUTION INTRAVENOUS; SUBCUTANEOUS EVERY 8 HOURS SCHEDULED
Status: DISCONTINUED | OUTPATIENT
Start: 2017-01-01 | End: 2017-01-01

## 2017-01-01 RX ORDER — SODIUM CHLORIDE AND POTASSIUM CHLORIDE 150; 450 MG/100ML; MG/100ML
100 INJECTION, SOLUTION INTRAVENOUS CONTINUOUS
Status: DISCONTINUED | OUTPATIENT
Start: 2017-01-01 | End: 2017-01-01

## 2017-01-01 RX ORDER — NEOSTIGMINE METHYLSULFATE 1 MG/ML
INJECTION, SOLUTION INTRAVENOUS AS NEEDED
Status: DISCONTINUED | OUTPATIENT
Start: 2017-01-01 | End: 2017-01-01 | Stop reason: SURG

## 2017-01-01 RX ORDER — FONDAPARINUX SODIUM 2.5 MG/.5ML
2.5 INJECTION SUBCUTANEOUS
Status: DISCONTINUED | OUTPATIENT
Start: 2017-01-01 | End: 2017-01-01 | Stop reason: HOSPADM

## 2017-01-01 RX ORDER — LIDOCAINE HYDROCHLORIDE 10 MG/ML
INJECTION, SOLUTION EPIDURAL; INFILTRATION; INTRACAUDAL; PERINEURAL AS NEEDED
Status: DISCONTINUED | OUTPATIENT
Start: 2017-01-01 | End: 2017-01-01 | Stop reason: SURG

## 2017-01-01 RX ORDER — POTASSIUM CHLORIDE 29.8 MG/ML
20 INJECTION INTRAVENOUS
Status: DISCONTINUED | OUTPATIENT
Start: 2017-01-01 | End: 2017-01-01 | Stop reason: HOSPADM

## 2017-01-01 RX ORDER — NICOTINE POLACRILEX 4 MG
15 LOZENGE BUCCAL
Status: DISCONTINUED | OUTPATIENT
Start: 2017-01-01 | End: 2017-01-01 | Stop reason: HOSPADM

## 2017-01-01 RX ORDER — ACEBUTOLOL HYDROCHLORIDE 200 MG/1
CAPSULE ORAL
Refills: 6 | Status: ON HOLD | COMMUNITY
Start: 2017-02-21 | End: 2017-01-01

## 2017-01-01 RX ORDER — ATRACURIUM BESYLATE 10 MG/ML
INJECTION, SOLUTION INTRAVENOUS AS NEEDED
Status: DISCONTINUED | OUTPATIENT
Start: 2017-01-01 | End: 2017-01-01 | Stop reason: SURG

## 2017-01-01 RX ORDER — UBIDECARENONE 50 MG
50 CAPSULE ORAL DAILY
COMMUNITY

## 2017-01-01 RX ORDER — FENTANYL CITRATE 50 UG/ML
INJECTION, SOLUTION INTRAMUSCULAR; INTRAVENOUS AS NEEDED
Status: DISCONTINUED | OUTPATIENT
Start: 2017-01-01 | End: 2017-01-01 | Stop reason: SURG

## 2017-01-01 RX ORDER — ONDANSETRON 4 MG/1
4 TABLET, FILM COATED ORAL EVERY 6 HOURS PRN
Status: DISCONTINUED | OUTPATIENT
Start: 2017-01-01 | End: 2017-01-01 | Stop reason: HOSPADM

## 2017-01-01 RX ORDER — GLYCOPYRROLATE 0.2 MG/ML
INJECTION INTRAMUSCULAR; INTRAVENOUS AS NEEDED
Status: DISCONTINUED | OUTPATIENT
Start: 2017-01-01 | End: 2017-01-01 | Stop reason: SURG

## 2017-01-01 RX ORDER — SODIUM CHLORIDE, SODIUM LACTATE, POTASSIUM CHLORIDE, CALCIUM CHLORIDE 600; 310; 30; 20 MG/100ML; MG/100ML; MG/100ML; MG/100ML
9 INJECTION, SOLUTION INTRAVENOUS CONTINUOUS PRN
Status: DISCONTINUED | OUTPATIENT
Start: 2017-01-01 | End: 2017-01-01 | Stop reason: HOSPADM

## 2017-01-01 RX ORDER — PANTOPRAZOLE SODIUM 40 MG/1
40 TABLET, DELAYED RELEASE ORAL EVERY MORNING
Status: DISCONTINUED | OUTPATIENT
Start: 2017-01-01 | End: 2017-01-01

## 2017-01-01 RX ORDER — MORPHINE SULFATE 10 MG/ML
4 INJECTION INTRAMUSCULAR; INTRAVENOUS; SUBCUTANEOUS
Status: DISCONTINUED | OUTPATIENT
Start: 2017-01-01 | End: 2017-01-01 | Stop reason: HOSPADM

## 2017-01-01 RX ORDER — CELECOXIB 200 MG/1
200 CAPSULE ORAL DAILY
Status: ON HOLD | COMMUNITY
End: 2017-01-01

## 2017-01-01 RX ORDER — CLOPIDOGREL BISULFATE 75 MG/1
TABLET ORAL
Refills: 3 | COMMUNITY
Start: 2017-01-23

## 2017-01-01 RX ORDER — CHLORPROMAZINE HYDROCHLORIDE 25 MG/1
25 TABLET, FILM COATED ORAL 3 TIMES DAILY PRN
Status: DISCONTINUED | OUTPATIENT
Start: 2017-01-01 | End: 2017-01-01 | Stop reason: HOSPADM

## 2017-01-01 RX ORDER — ESOMEPRAZOLE MAGNESIUM 40 MG/1
CAPSULE, DELAYED RELEASE ORAL
Refills: 5 | COMMUNITY
Start: 2017-01-09

## 2017-01-01 RX ORDER — NITROFURANTOIN 25; 75 MG/1; MG/1
100 CAPSULE ORAL 2 TIMES DAILY
Qty: 14 CAPSULE | Refills: 0 | Status: SHIPPED | OUTPATIENT
Start: 2017-01-01 | End: 2017-01-01 | Stop reason: HOSPADM

## 2017-01-01 RX ORDER — OXYCODONE HYDROCHLORIDE AND ACETAMINOPHEN 5; 325 MG/1; MG/1
TABLET ORAL
Refills: 0 | COMMUNITY
Start: 2017-01-01 | End: 2017-01-01 | Stop reason: HOSPADM

## 2017-01-01 RX ORDER — SODIUM CHLORIDE 0.9 % (FLUSH) 0.9 %
1-10 SYRINGE (ML) INJECTION AS NEEDED
Status: DISCONTINUED | OUTPATIENT
Start: 2017-01-01 | End: 2017-01-01 | Stop reason: HOSPADM

## 2017-01-01 RX ORDER — AMPICILLIN TRIHYDRATE 250 MG
1000 CAPSULE ORAL DAILY
COMMUNITY

## 2017-01-01 RX ORDER — SUCRALFATE ORAL 1 G/10ML
1 SUSPENSION ORAL 4 TIMES DAILY
COMMUNITY

## 2017-01-01 RX ORDER — TURMERIC ROOT EXTRACT 500 MG
TABLET ORAL DAILY
COMMUNITY
End: 2017-01-01 | Stop reason: HOSPADM

## 2017-01-01 RX ORDER — TAMSULOSIN HYDROCHLORIDE 0.4 MG/1
CAPSULE ORAL
Refills: 1 | COMMUNITY
Start: 2017-01-01

## 2017-01-01 RX ORDER — TOBRAMYCIN AND DEXAMETHASONE 3; 1 MG/ML; MG/ML
SUSPENSION/ DROPS OPHTHALMIC
Refills: 0 | Status: ON HOLD | COMMUNITY
Start: 2017-01-01 | End: 2017-01-01

## 2017-01-01 RX ORDER — MAGNESIUM HYDROXIDE 1200 MG/15ML
LIQUID ORAL AS NEEDED
Status: DISCONTINUED | OUTPATIENT
Start: 2017-01-01 | End: 2017-01-01 | Stop reason: HOSPADM

## 2017-01-01 RX ORDER — ONDANSETRON 4 MG/1
TABLET, ORALLY DISINTEGRATING ORAL
Refills: 0 | COMMUNITY
Start: 2017-01-01 | End: 2017-01-01 | Stop reason: HOSPADM

## 2017-01-01 RX ORDER — HYDROMORPHONE HYDROCHLORIDE 1 MG/ML
0.5 INJECTION, SOLUTION INTRAMUSCULAR; INTRAVENOUS; SUBCUTANEOUS
Status: DISCONTINUED | OUTPATIENT
Start: 2017-01-01 | End: 2017-01-01

## 2017-01-01 RX ORDER — ALLOPURINOL 300 MG/1
TABLET ORAL
Refills: 3 | COMMUNITY
Start: 2017-03-06

## 2017-01-01 RX ORDER — FAMOTIDINE 10 MG/ML
20 INJECTION, SOLUTION INTRAVENOUS
Status: DISCONTINUED | OUTPATIENT
Start: 2017-01-01 | End: 2017-01-01 | Stop reason: HOSPADM

## 2017-01-01 RX ORDER — CIPROFLOXACIN 500 MG/1
TABLET, FILM COATED ORAL
Refills: 0 | COMMUNITY
Start: 2017-01-01 | End: 2017-01-01 | Stop reason: HOSPADM

## 2017-01-01 RX ORDER — ROCURONIUM BROMIDE 10 MG/ML
INJECTION, SOLUTION INTRAVENOUS AS NEEDED
Status: DISCONTINUED | OUTPATIENT
Start: 2017-01-01 | End: 2017-01-01 | Stop reason: SURG

## 2017-01-01 RX ORDER — METRONIDAZOLE 250 MG/1
TABLET ORAL
Refills: 0 | COMMUNITY
Start: 2017-01-01 | End: 2017-01-01 | Stop reason: HOSPADM

## 2017-01-01 RX ORDER — ASPIRIN 81 MG/1
81 TABLET ORAL DAILY
COMMUNITY

## 2017-01-01 RX ORDER — DEXTROSE MONOHYDRATE 25 G/50ML
25 INJECTION, SOLUTION INTRAVENOUS
Status: DISCONTINUED | OUTPATIENT
Start: 2017-01-01 | End: 2017-01-01 | Stop reason: HOSPADM

## 2017-01-01 RX ORDER — OSELTAMIVIR PHOSPHATE 75 MG/1
CAPSULE ORAL
Refills: 0 | Status: ON HOLD | COMMUNITY
Start: 2017-01-01 | End: 2017-01-01

## 2017-01-01 RX ADMIN — CHLORPROMAZINE HYDROCHLORIDE 25 MG: 25 TABLET, SUGAR COATED ORAL at 09:29

## 2017-01-01 RX ADMIN — PHENYLEPHRINE HYDROCHLORIDE 200 MCG: 10 INJECTION INTRAVENOUS at 14:55

## 2017-01-01 RX ADMIN — SODIUM CHLORIDE 1000 ML: 9 INJECTION, SOLUTION INTRAVENOUS at 14:28

## 2017-01-01 RX ADMIN — HEPARIN SODIUM 5000 UNITS: 5000 INJECTION, SOLUTION INTRAVENOUS; SUBCUTANEOUS at 13:36

## 2017-01-01 RX ADMIN — HEPARIN SODIUM 5000 UNITS: 5000 INJECTION, SOLUTION INTRAVENOUS; SUBCUTANEOUS at 05:37

## 2017-01-01 RX ADMIN — DIAZEPAM 2.5 MG: 5 INJECTION, SOLUTION INTRAMUSCULAR; INTRAVENOUS at 17:07

## 2017-01-01 RX ADMIN — FENTANYL CITRATE 100 MCG: 50 INJECTION, SOLUTION INTRAMUSCULAR; INTRAVENOUS at 14:55

## 2017-01-01 RX ADMIN — PIPERACILLIN SODIUM,TAZOBACTAM SODIUM 3.38 G: 3; .375 INJECTION, POWDER, FOR SOLUTION INTRAVENOUS at 14:40

## 2017-01-01 RX ADMIN — POTASSIUM CHLORIDE AND SODIUM CHLORIDE 100 ML/HR: 450; 150 INJECTION, SOLUTION INTRAVENOUS at 17:38

## 2017-01-01 RX ADMIN — HEPARIN SODIUM 5000 UNITS: 5000 INJECTION, SOLUTION INTRAVENOUS; SUBCUTANEOUS at 05:56

## 2017-01-01 RX ADMIN — SODIUM CHLORIDE, POTASSIUM CHLORIDE, SODIUM LACTATE AND CALCIUM CHLORIDE 70 ML/HR: 600; 310; 30; 20 INJECTION, SOLUTION INTRAVENOUS at 04:31

## 2017-01-01 RX ADMIN — PIPERACILLIN SODIUM,TAZOBACTAM SODIUM 3.38 G: 3; .375 INJECTION, POWDER, FOR SOLUTION INTRAVENOUS at 20:29

## 2017-01-01 RX ADMIN — POTASSIUM PHOSPHATE, MONOBASIC AND POTASSIUM PHOSPHATE, DIBASIC 15 MMOL: 224; 236 INJECTION, SOLUTION INTRAVENOUS at 13:36

## 2017-01-01 RX ADMIN — SODIUM CHLORIDE, POTASSIUM CHLORIDE, SODIUM LACTATE AND CALCIUM CHLORIDE 110 ML/HR: 600; 310; 30; 20 INJECTION, SOLUTION INTRAVENOUS at 03:00

## 2017-01-01 RX ADMIN — POTASSIUM CHLORIDE 20 MEQ: 29.8 INJECTION, SOLUTION INTRAVENOUS at 06:41

## 2017-01-01 RX ADMIN — PIPERACILLIN SODIUM,TAZOBACTAM SODIUM 3.38 G: 3; .375 INJECTION, POWDER, FOR SOLUTION INTRAVENOUS at 02:03

## 2017-01-01 RX ADMIN — MAGNESIUM SULFATE HEPTAHYDRATE 2 G: 40 INJECTION, SOLUTION INTRAVENOUS at 05:19

## 2017-01-01 RX ADMIN — PIPERACILLIN SODIUM,TAZOBACTAM SODIUM 3.38 G: 3; .375 INJECTION, POWDER, FOR SOLUTION INTRAVENOUS at 02:44

## 2017-01-01 RX ADMIN — POTASSIUM CHLORIDE: 2 INJECTION, SOLUTION, CONCENTRATE INTRAVENOUS at 18:08

## 2017-01-01 RX ADMIN — SODIUM CHLORIDE 1000 ML: 9 INJECTION, SOLUTION INTRAVENOUS at 11:23

## 2017-01-01 RX ADMIN — CHLORPROMAZINE HYDROCHLORIDE 25 MG: 25 TABLET, SUGAR COATED ORAL at 14:57

## 2017-01-01 RX ADMIN — PANTOPRAZOLE SODIUM 40 MG: 40 INJECTION, POWDER, FOR SOLUTION INTRAVENOUS at 05:15

## 2017-01-01 RX ADMIN — PROPOFOL 100 MG: 10 INJECTION, EMULSION INTRAVENOUS at 14:55

## 2017-01-01 RX ADMIN — SENNOSIDES A AND B 10 ML: 415.36 LIQUID ORAL at 08:49

## 2017-01-01 RX ADMIN — POTASSIUM CHLORIDE: 2 INJECTION, SOLUTION, CONCENTRATE INTRAVENOUS at 18:01

## 2017-01-01 RX ADMIN — PIPERACILLIN SODIUM,TAZOBACTAM SODIUM 3.38 G: 3; .375 INJECTION, POWDER, FOR SOLUTION INTRAVENOUS at 08:40

## 2017-01-01 RX ADMIN — PIPERACILLIN SODIUM,TAZOBACTAM SODIUM 3.38 G: 3; .375 INJECTION, POWDER, FOR SOLUTION INTRAVENOUS at 20:18

## 2017-01-01 RX ADMIN — SENNOSIDES A AND B 10 ML: 415.36 LIQUID ORAL at 22:14

## 2017-01-01 RX ADMIN — POTASSIUM CHLORIDE: 2 INJECTION, SOLUTION, CONCENTRATE INTRAVENOUS at 17:41

## 2017-01-01 RX ADMIN — PIPERACILLIN SODIUM,TAZOBACTAM SODIUM 3.38 G: 3; .375 INJECTION, POWDER, FOR SOLUTION INTRAVENOUS at 13:54

## 2017-01-01 RX ADMIN — HEPARIN SODIUM 5000 UNITS: 5000 INJECTION, SOLUTION INTRAVENOUS; SUBCUTANEOUS at 06:00

## 2017-01-01 RX ADMIN — PIPERACILLIN SODIUM,TAZOBACTAM SODIUM 3.38 G: 3; .375 INJECTION, POWDER, FOR SOLUTION INTRAVENOUS at 14:58

## 2017-01-01 RX ADMIN — HEPARIN SODIUM 5000 UNITS: 5000 INJECTION, SOLUTION INTRAVENOUS; SUBCUTANEOUS at 14:15

## 2017-01-01 RX ADMIN — PIPERACILLIN SODIUM,TAZOBACTAM SODIUM 3.38 G: 3; .375 INJECTION, POWDER, FOR SOLUTION INTRAVENOUS at 14:15

## 2017-01-01 RX ADMIN — MAGNESIUM SULFATE HEPTAHYDRATE 2 G: 40 INJECTION, SOLUTION INTRAVENOUS at 06:45

## 2017-01-01 RX ADMIN — SODIUM CHLORIDE, POTASSIUM CHLORIDE, SODIUM LACTATE AND CALCIUM CHLORIDE: 600; 310; 30; 20 INJECTION, SOLUTION INTRAVENOUS at 11:40

## 2017-01-01 RX ADMIN — HEPARIN SODIUM 5000 UNITS: 5000 INJECTION, SOLUTION INTRAVENOUS; SUBCUTANEOUS at 13:34

## 2017-01-01 RX ADMIN — PIPERACILLIN SODIUM,TAZOBACTAM SODIUM 3.38 G: 3; .375 INJECTION, POWDER, FOR SOLUTION INTRAVENOUS at 08:21

## 2017-01-01 RX ADMIN — PANTOPRAZOLE SODIUM 40 MG: 40 INJECTION, POWDER, FOR SOLUTION INTRAVENOUS at 06:01

## 2017-01-01 RX ADMIN — PIPERACILLIN SODIUM,TAZOBACTAM SODIUM 3.38 G: 3; .375 INJECTION, POWDER, FOR SOLUTION INTRAVENOUS at 01:50

## 2017-01-01 RX ADMIN — PIPERACILLIN SODIUM,TAZOBACTAM SODIUM 3.38 G: 3; .375 INJECTION, POWDER, FOR SOLUTION INTRAVENOUS at 13:34

## 2017-01-01 RX ADMIN — PANTOPRAZOLE SODIUM 40 MG: 40 INJECTION, POWDER, FOR SOLUTION INTRAVENOUS at 05:28

## 2017-01-01 RX ADMIN — PIPERACILLIN SODIUM,TAZOBACTAM SODIUM 3.38 G: 3; .375 INJECTION, POWDER, FOR SOLUTION INTRAVENOUS at 08:57

## 2017-01-01 RX ADMIN — PANTOPRAZOLE SODIUM 40 MG: 40 INJECTION, POWDER, FOR SOLUTION INTRAVENOUS at 05:56

## 2017-01-01 RX ADMIN — PIPERACILLIN SODIUM,TAZOBACTAM SODIUM 3.38 G: 3; .375 INJECTION, POWDER, FOR SOLUTION INTRAVENOUS at 20:47

## 2017-01-01 RX ADMIN — HEPARIN SODIUM 5000 UNITS: 5000 INJECTION, SOLUTION INTRAVENOUS; SUBCUTANEOUS at 21:59

## 2017-01-01 RX ADMIN — FONDAPARINUX SODIUM 2.5 MG: 2.5 INJECTION SUBCUTANEOUS at 08:48

## 2017-01-01 RX ADMIN — PIPERACILLIN SODIUM,TAZOBACTAM SODIUM 3.38 G: 3; .375 INJECTION, POWDER, FOR SOLUTION INTRAVENOUS at 20:26

## 2017-01-01 RX ADMIN — LIDOCAINE HYDROCHLORIDE 1 ML: 20 JELLY TOPICAL at 09:55

## 2017-01-01 RX ADMIN — PIPERACILLIN SODIUM,TAZOBACTAM SODIUM 3.38 G: 3; .375 INJECTION, POWDER, FOR SOLUTION INTRAVENOUS at 08:48

## 2017-01-01 RX ADMIN — NEOSTIGMINE METHYLSULFATE 5 MG: 1 INJECTION, SOLUTION INTRAVENOUS at 16:00

## 2017-01-01 RX ADMIN — PIPERACILLIN SODIUM,TAZOBACTAM SODIUM 3.38 G: 3; .375 INJECTION, POWDER, FOR SOLUTION INTRAVENOUS at 21:29

## 2017-01-01 RX ADMIN — THIAMINE HYDROCHLORIDE 100 MG: 100 INJECTION, SOLUTION INTRAMUSCULAR; INTRAVENOUS at 08:40

## 2017-01-01 RX ADMIN — FONDAPARINUX SODIUM 2.5 MG: 2.5 INJECTION SUBCUTANEOUS at 08:36

## 2017-01-01 RX ADMIN — FENTANYL CITRATE 50 MCG: 50 INJECTION INTRAMUSCULAR; INTRAVENOUS at 23:45

## 2017-01-01 RX ADMIN — PHENYLEPHRINE HYDROCHLORIDE 200 MCG: 10 INJECTION INTRAVENOUS at 15:20

## 2017-01-01 RX ADMIN — DIAZEPAM 2.5 MG: 5 INJECTION, SOLUTION INTRAMUSCULAR; INTRAVENOUS at 01:10

## 2017-01-01 RX ADMIN — ONDANSETRON 4 MG: 2 INJECTION INTRAMUSCULAR; INTRAVENOUS at 15:35

## 2017-01-01 RX ADMIN — PIPERACILLIN SODIUM,TAZOBACTAM SODIUM 3.38 G: 3; .375 INJECTION, POWDER, FOR SOLUTION INTRAVENOUS at 05:56

## 2017-01-01 RX ADMIN — PIPERACILLIN SODIUM,TAZOBACTAM SODIUM 3.38 G: 3; .375 INJECTION, POWDER, FOR SOLUTION INTRAVENOUS at 02:50

## 2017-01-01 RX ADMIN — PIPERACILLIN SODIUM,TAZOBACTAM SODIUM 3.38 G: 3; .375 INJECTION, POWDER, FOR SOLUTION INTRAVENOUS at 06:07

## 2017-01-01 RX ADMIN — LIDOCAINE HYDROCHLORIDE 60 MG: 10 INJECTION, SOLUTION EPIDURAL; INFILTRATION; INTRACAUDAL; PERINEURAL at 14:56

## 2017-01-01 RX ADMIN — POTASSIUM CHLORIDE: 2 INJECTION, SOLUTION, CONCENTRATE INTRAVENOUS at 17:07

## 2017-01-01 RX ADMIN — THIAMINE HYDROCHLORIDE 100 MG: 100 INJECTION, SOLUTION INTRAMUSCULAR; INTRAVENOUS at 08:21

## 2017-01-01 RX ADMIN — PIPERACILLIN SODIUM,TAZOBACTAM SODIUM 3.38 G: 3; .375 INJECTION, POWDER, FOR SOLUTION INTRAVENOUS at 15:10

## 2017-01-01 RX ADMIN — PHENYLEPHRINE HYDROCHLORIDE 200 MCG: 10 INJECTION INTRAVENOUS at 15:05

## 2017-01-01 RX ADMIN — SODIUM CHLORIDE 500 ML: 9 INJECTION, SOLUTION INTRAVENOUS at 12:03

## 2017-01-01 RX ADMIN — HEPARIN SODIUM 5000 UNITS: 5000 INJECTION, SOLUTION INTRAVENOUS; SUBCUTANEOUS at 21:11

## 2017-01-01 RX ADMIN — POTASSIUM CHLORIDE: 2 INJECTION, SOLUTION, CONCENTRATE INTRAVENOUS at 17:50

## 2017-01-01 RX ADMIN — FENTANYL CITRATE 150 MCG: 50 INJECTION, SOLUTION INTRAMUSCULAR; INTRAVENOUS at 15:50

## 2017-01-01 RX ADMIN — NOREPINEPHRINE BITARTRATE 0.05 MCG/KG/MIN: 1 INJECTION, SOLUTION, CONCENTRATE INTRAVENOUS at 15:25

## 2017-01-01 RX ADMIN — PANTOPRAZOLE SODIUM 40 MG: 40 INJECTION, POWDER, FOR SOLUTION INTRAVENOUS at 05:06

## 2017-01-01 RX ADMIN — SODIUM CHLORIDE, POTASSIUM CHLORIDE, SODIUM LACTATE AND CALCIUM CHLORIDE: 600; 310; 30; 20 INJECTION, SOLUTION INTRAVENOUS at 15:30

## 2017-01-01 RX ADMIN — PANTOPRAZOLE SODIUM 40 MG: 40 INJECTION, POWDER, FOR SOLUTION INTRAVENOUS at 05:37

## 2017-01-01 RX ADMIN — POTASSIUM CHLORIDE: 2 INJECTION, SOLUTION, CONCENTRATE INTRAVENOUS at 17:03

## 2017-01-01 RX ADMIN — SODIUM CHLORIDE: 9 INJECTION, SOLUTION INTRAVENOUS at 15:35

## 2017-01-01 RX ADMIN — PIPERACILLIN SODIUM,TAZOBACTAM SODIUM 3.38 G: 3; .375 INJECTION, POWDER, FOR SOLUTION INTRAVENOUS at 21:10

## 2017-01-01 RX ADMIN — SODIUM CHLORIDE, POTASSIUM CHLORIDE, SODIUM LACTATE AND CALCIUM CHLORIDE 130 ML/HR: 600; 310; 30; 20 INJECTION, SOLUTION INTRAVENOUS at 18:09

## 2017-01-01 RX ADMIN — SODIUM PHOSPHATE, MONOBASIC, MONOHYDRATE AND SODIUM PHOSPHATE, DIBASIC, ANHYDROUS 15 MMOL: 276; 142 INJECTION, SOLUTION INTRAVENOUS at 13:14

## 2017-01-01 RX ADMIN — SODIUM CHLORIDE, POTASSIUM CHLORIDE, SODIUM LACTATE AND CALCIUM CHLORIDE 150 ML/HR: 600; 310; 30; 20 INJECTION, SOLUTION INTRAVENOUS at 18:37

## 2017-01-01 RX ADMIN — SENNOSIDES A AND B 10 ML: 415.36 LIQUID ORAL at 11:01

## 2017-01-01 RX ADMIN — HEPARIN SODIUM 5000 UNITS: 5000 INJECTION, SOLUTION INTRAVENOUS; SUBCUTANEOUS at 21:33

## 2017-01-01 RX ADMIN — HEPARIN SODIUM 5000 UNITS: 5000 INJECTION, SOLUTION INTRAVENOUS; SUBCUTANEOUS at 21:47

## 2017-01-01 RX ADMIN — PIPERACILLIN SODIUM,TAZOBACTAM SODIUM 3.38 G: 3; .375 INJECTION, POWDER, FOR SOLUTION INTRAVENOUS at 08:02

## 2017-01-01 RX ADMIN — SODIUM CHLORIDE, POTASSIUM CHLORIDE, SODIUM LACTATE AND CALCIUM CHLORIDE 70 ML/HR: 600; 310; 30; 20 INJECTION, SOLUTION INTRAVENOUS at 13:36

## 2017-01-01 RX ADMIN — DEXAMETHASONE SODIUM PHOSPHATE: 10 INJECTION, SOLUTION INTRAMUSCULAR; INTRAVENOUS at 15:44

## 2017-01-01 RX ADMIN — SODIUM CHLORIDE, POTASSIUM CHLORIDE, SODIUM LACTATE AND CALCIUM CHLORIDE 150 ML/HR: 600; 310; 30; 20 INJECTION, SOLUTION INTRAVENOUS at 09:58

## 2017-01-01 RX ADMIN — FONDAPARINUX SODIUM 2.5 MG: 2.5 INJECTION SUBCUTANEOUS at 09:31

## 2017-01-01 RX ADMIN — ONDANSETRON 12 MG: 2 INJECTION INTRAMUSCULAR; INTRAVENOUS at 14:28

## 2017-01-01 RX ADMIN — THIAMINE HYDROCHLORIDE 100 MG: 100 INJECTION, SOLUTION INTRAMUSCULAR; INTRAVENOUS at 15:16

## 2017-01-01 RX ADMIN — POTASSIUM CHLORIDE 20 MEQ: 29.8 INJECTION, SOLUTION INTRAVENOUS at 05:19

## 2017-01-01 RX ADMIN — SODIUM CHLORIDE 250 ML: 9 INJECTION, SOLUTION INTRAVENOUS at 10:00

## 2017-01-01 RX ADMIN — CHLORPROMAZINE HYDROCHLORIDE 25 MG: 25 TABLET, SUGAR COATED ORAL at 21:37

## 2017-01-01 RX ADMIN — PIPERACILLIN SODIUM,TAZOBACTAM SODIUM 3.38 G: 3; .375 INJECTION, POWDER, FOR SOLUTION INTRAVENOUS at 13:36

## 2017-01-01 RX ADMIN — MAGNESIUM SULFATE HEPTAHYDRATE 2 G: 40 INJECTION, SOLUTION INTRAVENOUS at 10:00

## 2017-01-01 RX ADMIN — ROCURONIUM BROMIDE 5 MG: 10 INJECTION INTRAVENOUS at 14:55

## 2017-01-01 RX ADMIN — SODIUM CHLORIDE, POTASSIUM CHLORIDE, SODIUM LACTATE AND CALCIUM CHLORIDE 150 ML/HR: 600; 310; 30; 20 INJECTION, SOLUTION INTRAVENOUS at 02:12

## 2017-01-01 RX ADMIN — SODIUM CHLORIDE: 9 INJECTION, SOLUTION INTRAVENOUS at 09:40

## 2017-01-01 RX ADMIN — ROBINUL 0.4 MG: 0.2 INJECTION INTRAMUSCULAR; INTRAVENOUS at 16:00

## 2017-01-01 RX ADMIN — HEPARIN SODIUM 5000 UNITS: 5000 INJECTION, SOLUTION INTRAVENOUS; SUBCUTANEOUS at 05:15

## 2017-01-01 RX ADMIN — ATRACURIUM BESYLATE 40 MG: 10 INJECTION, SOLUTION INTRAVENOUS at 15:25

## 2017-01-01 RX ADMIN — ALBUMIN HUMAN: 0.25 SOLUTION INTRAVENOUS at 14:55

## 2017-01-01 RX ADMIN — PANTOPRAZOLE SODIUM 40 MG: 40 INJECTION, POWDER, FOR SOLUTION INTRAVENOUS at 05:29

## 2017-01-01 RX ADMIN — DOCUSATE SODIUM 100 MG: 100 CAPSULE, LIQUID FILLED ORAL at 09:28

## 2017-01-01 RX ADMIN — PIPERACILLIN SODIUM,TAZOBACTAM SODIUM 3.38 G: 3; .375 INJECTION, POWDER, FOR SOLUTION INTRAVENOUS at 20:27

## 2017-01-01 RX ADMIN — PIPERACILLIN SODIUM,TAZOBACTAM SODIUM 3.38 G: 3; .375 INJECTION, POWDER, FOR SOLUTION INTRAVENOUS at 02:06

## 2017-01-10 ENCOUNTER — TELEPHONE (OUTPATIENT)
Dept: ONCOLOGY | Facility: CLINIC | Age: 82
End: 2017-01-10

## 2017-01-10 DIAGNOSIS — D75.81 MYELOFIBROSIS (HCC): Primary | ICD-10-CM

## 2017-01-10 RX ORDER — FUROSEMIDE 10 MG/ML
20 INJECTION INTRAMUSCULAR; INTRAVENOUS ONCE
Status: CANCELLED | OUTPATIENT
Start: 2017-01-10 | End: 2017-01-10

## 2017-01-10 RX ORDER — ACETAMINOPHEN 325 MG/1
650 TABLET ORAL ONCE
Status: CANCELLED | OUTPATIENT
Start: 2017-01-10 | End: 2017-01-10

## 2017-01-10 RX ORDER — DIPHENHYDRAMINE HCL 25 MG
25 CAPSULE ORAL ONCE
Status: CANCELLED | OUTPATIENT
Start: 2017-01-10 | End: 2017-01-10

## 2017-01-10 NOTE — TELEPHONE ENCOUNTER
----- Message from Chrissie Bishop sent at 1/10/2017 10:08 AM EST -----  Regarding: dionisio alcaraz call mr claros  Please call Mr Claros  [phone]  Concerning Neil Houser.  [facility] appt   And blood work

## 2017-01-10 NOTE — TELEPHONE ENCOUNTER
Left VM for patient's assistant to return call if still needed. Left info re [facility] beatris on 1/26 @ 9595 and f/u with Dr Thakur on 1/30. To return call if he has any further questions

## 2017-01-11 ENCOUNTER — APPOINTMENT (OUTPATIENT)
Dept: ONCOLOGY | Facility: HOSPITAL | Age: 82
End: 2017-01-11

## 2017-01-12 ENCOUNTER — INFUSION (OUTPATIENT)
Dept: ONCOLOGY | Facility: HOSPITAL | Age: 82
End: 2017-01-12

## 2017-01-12 VITALS
RESPIRATION RATE: 16 BRPM | TEMPERATURE: 98 F | SYSTOLIC BLOOD PRESSURE: 90 MMHG | HEIGHT: 72 IN | DIASTOLIC BLOOD PRESSURE: 51 MMHG | BODY MASS INDEX: 24.38 KG/M2 | WEIGHT: 180 LBS | HEART RATE: 57 BPM

## 2017-01-12 DIAGNOSIS — Z01.89 LABORATORY TEST: Primary | ICD-10-CM

## 2017-01-12 DIAGNOSIS — D75.81 MYELOFIBROSIS (HCC): ICD-10-CM

## 2017-01-12 LAB
ABO GROUP BLD: NORMAL
ABO GROUP BLD: NORMAL
BLD GP AB SCN SERPL QL: NEGATIVE
RH BLD: POSITIVE
RH BLD: POSITIVE

## 2017-01-12 PROCEDURE — 86901 BLOOD TYPING SEROLOGIC RH(D): CPT

## 2017-01-12 PROCEDURE — 25010000002 FUROSEMIDE PER 20 MG: Performed by: INTERNAL MEDICINE

## 2017-01-12 PROCEDURE — 86900 BLOOD TYPING SEROLOGIC ABO: CPT

## 2017-01-12 PROCEDURE — 63710000001 DIPHENHYDRAMINE PER 50 MG: Performed by: INTERNAL MEDICINE

## 2017-01-12 PROCEDURE — 96374 THER/PROPH/DIAG INJ IV PUSH: CPT

## 2017-01-12 PROCEDURE — P9016 RBC LEUKOCYTES REDUCED: HCPCS

## 2017-01-12 PROCEDURE — 86850 RBC ANTIBODY SCREEN: CPT

## 2017-01-12 PROCEDURE — 36430 TRANSFUSION BLD/BLD COMPNT: CPT

## 2017-01-12 PROCEDURE — 36415 COLL VENOUS BLD VENIPUNCTURE: CPT

## 2017-01-12 PROCEDURE — 86920 COMPATIBILITY TEST SPIN: CPT

## 2017-01-12 RX ORDER — FUROSEMIDE 10 MG/ML
20 INJECTION INTRAMUSCULAR; INTRAVENOUS ONCE
Status: COMPLETED | OUTPATIENT
Start: 2017-01-12 | End: 2017-01-12

## 2017-01-12 RX ORDER — DIPHENHYDRAMINE HCL 25 MG
25 CAPSULE ORAL ONCE
Status: COMPLETED | OUTPATIENT
Start: 2017-01-12 | End: 2017-01-12

## 2017-01-12 RX ORDER — ACETAMINOPHEN 325 MG/1
650 TABLET ORAL ONCE
Status: COMPLETED | OUTPATIENT
Start: 2017-01-12 | End: 2017-01-12

## 2017-01-12 RX ADMIN — ACETAMINOPHEN 650 MG: 325 TABLET, FILM COATED ORAL at 09:01

## 2017-01-12 RX ADMIN — FUROSEMIDE 20 MG: 10 INJECTION, SOLUTION INTRAMUSCULAR; INTRAVENOUS at 12:07

## 2017-01-12 RX ADMIN — DIPHENHYDRAMINE HYDROCHLORIDE 25 MG: 25 CAPSULE ORAL at 09:01

## 2017-01-13 ENCOUNTER — OFFICE VISIT (OUTPATIENT)
Dept: ONCOLOGY | Facility: CLINIC | Age: 82
End: 2017-01-13

## 2017-01-13 VITALS
WEIGHT: 183 LBS | BODY MASS INDEX: 24.79 KG/M2 | HEIGHT: 72 IN | SYSTOLIC BLOOD PRESSURE: 109 MMHG | HEART RATE: 74 BPM | RESPIRATION RATE: 18 BRPM | DIASTOLIC BLOOD PRESSURE: 61 MMHG | TEMPERATURE: 97.2 F

## 2017-01-13 DIAGNOSIS — D75.81 MYELOFIBROSIS (HCC): Primary | ICD-10-CM

## 2017-01-13 LAB
ABO + RH BLD: NORMAL
ABO + RH BLD: NORMAL
BH BB BLOOD EXPIRATION DATE: NORMAL
BH BB BLOOD EXPIRATION DATE: NORMAL
BH BB BLOOD TYPE BARCODE: 6200
BH BB BLOOD TYPE BARCODE: 6200
BH BB DISPENSE STATUS: NORMAL
BH BB DISPENSE STATUS: NORMAL
BH BB PRODUCT CODE: NORMAL
BH BB PRODUCT CODE: NORMAL
BH BB UNIT NUMBER: NORMAL
BH BB UNIT NUMBER: NORMAL
CROSSMATCH INTERPRETATION: NORMAL
CROSSMATCH INTERPRETATION: NORMAL
UNIT  ABO: NORMAL
UNIT  ABO: NORMAL
UNIT  RH: NORMAL
UNIT  RH: NORMAL

## 2017-01-13 PROCEDURE — 99214 OFFICE O/P EST MOD 30 MIN: CPT | Performed by: INTERNAL MEDICINE

## 2017-01-13 NOTE — PROGRESS NOTES
DATE OF VISIT: 1/13/2017     REASON FOR VISIT: Follow-up for MF post polycythemia vera.      HISTORY OF PRESENT ILLNESS: The patient is a very pleasant 86-year-old  gentleman with past medical history significant for polycythemia vera, JAK2  positive. The patient has been receiving phlebotomy periodically; currently he  is on once every 3 months targeting hematocrit less than 45.  The patient presented with fatigue and weakness found to have new onset anemia with leukocytosis.  Bone marrow biopsy done December 12, 2016 came back consistent with myelofibrosis.  The patient is  here today with a new complain.     SUBJECTIVE: The patient has been anxious about his new diagnosis of myelofibrosis.  His biggest complaint is fatigue this has been going on for the last few months.  He does feel a bit better after blood transfusion. He denied any headaches,  denied any night sweats, no diarrhea, no fever or chills.      PAST MEDICAL HISTORY/SOCIAL HISTORY/FAMILY HISTORY: Unchanged from my prior  documentation done 02/06/2012.     Review of Systems   Constitutional: Positive for fatigue. Negative for activity change, appetite change, chills, fever and unexpected weight change.   HENT: Negative for hearing loss, mouth sores, nosebleeds, sore throat and trouble swallowing.    Eyes: Negative for visual disturbance.   Respiratory: Negative for cough, chest tightness, shortness of breath and wheezing.    Cardiovascular: Negative for chest pain, palpitations and leg swelling.   Gastrointestinal: Negative for abdominal distention, abdominal pain, blood in stool, constipation, diarrhea, nausea, rectal pain and vomiting.   Endocrine: Negative for cold intolerance and heat intolerance.   Genitourinary: Negative for difficulty urinating, dysuria, frequency and urgency.   Musculoskeletal: Negative for arthralgias, back pain, gait problem, joint swelling and myalgias.   Skin: Negative for rash.   Neurological: Negative for dizziness,  "tremors, syncope, weakness, light-headedness, numbness and headaches.   Hematological: Negative for adenopathy. Does not bruise/bleed easily.   Psychiatric/Behavioral: Negative for confusion, sleep disturbance and suicidal ideas. The patient is not nervous/anxious.          Current Outpatient Prescriptions:   •  Multiple Vitamins-Minerals (MULTIVITAL PO), Take  by mouth., Disp: , Rfl:   •  MYRBETRIQ 25 MG tablet sustained-release 24 hour, TK 1 T PO QHS, Disp: , Rfl: 2  •  Nutritional Supplements (NUTRITIONAL SUPPLEMENT PO), Take  by mouth. multiple, Disp: , Rfl:   •  pioglitazone (ACTOS) 45 MG tablet, TK 1 T PO QAM, Disp: , Rfl: 1  •  ramipril (ALTACE) 10 MG capsule, TK 1 C PO BID, Disp: , Rfl: 3  •  rosuvastatin (CRESTOR) 10 MG tablet, TK 1 T PO QD, Disp: , Rfl: 1  •  ruxolitinib (JAKAFI) 20 MG chemo tablet, Take 1 tablet by mouth 2 (Two) Times a Day., Disp: 60 tablet, Rfl: 5  •  valACYclovir (VALTREX) 1000 MG tablet, TK 1 T PO BID, Disp: , Rfl: 0    PHYSICAL EXAMINATION:   Visit Vitals   • /61   • Pulse 74   • Temp 97.2 °F (36.2 °C) (Temporal Artery )   • Resp 18   • Ht 72\" (182.9 cm)   • Wt 183 lb (83 kg)   • BMI 24.82 kg/m2      ECOG Performance Status: 1 - Symptomatic but completely ambulatory  General Appearance:  alert, cooperative, no apparent distress and appears stated age   Neurologic/Psychiatric: A&O x 3, gait steady, appropriate affect, strength 5/5 in all muscle groups   HEENT:  Normocephalic, without obvious abnormality, mucous membranes moist   Neck: Supple, symmetrical, trachea midline, no adenopathy;  No thyromegaly, masses, or tenderness   Lungs:   Clear to auscultation bilaterally; respirations regular, even, and unlabored bilaterally   Heart:  Regular rate and rhythm, no murmurs appreciated   Abdomen:   Soft, non-tender, non-distended and no organomegaly   Lymph nodes: No cervical, supraclavicular, inguinal or axillary adenopathy noted   Extremities: Normal, atraumatic; no clubbing, " cyanosis, or edema    Skin: No rashes, ulcers, or suspicious lesions noted     Infusion on 01/12/2017   Component Date Value Ref Range Status   • ABO Type 01/12/2017 A   Final   • RH type 01/12/2017 Positive   Final   • Antibody Screen 01/12/2017 Negative   Final   • Product Code 01/13/2017 [id number]   Final   • Unit Number 01/13/2017 [id number]   Final   • UNIT  ABO 01/13/2017 A   Final   • UNIT  RH 01/13/2017 POS   Final   • CROSSMATCH 1 INTERPRETATION 01/13/2017 Compatible   Final   • Dispense Status 01/13/2017 PT   Final   • Blood Type 01/13/2017 APOS   Final   • Blood Expiration Date 01/13/2017 201701172359   Final   • Blood Type Barcode 01/13/2017 6200   Final   • Product Code 01/13/2017 [id number]   Final   • Unit Number 01/13/2017 [id number]   Final   • UNIT  ABO 01/13/2017 A   Final   • UNIT  RH 01/13/2017 POS   Final   • CROSSMATCH 1 INTERPRETATION 01/13/2017 Compatible   Final   • Dispense Status 01/13/2017 PT   Final   • Blood Type 01/13/2017 APOS   Final   • Blood Expiration Date 01/13/2017 201701172359   Final   • Blood Type Barcode 01/13/2017 6200   Final   • ABO Type 01/12/2017 A   Final   • RH type 01/12/2017 Positive   Final        No results found.Bone Marrow Aspiration & Exam   Order: [id number]   Status:  Final result Visible to patient:  No (Not Released) Dx:  Polycythemia      7d ago     Clinical Information       The working history is a history of ANASTACIO-2 positive polycythemia vera. The patient has been receiving therapeutic phlebotomy with q 3 month blood draws. The patient has not had phlebotomies for the past 4 months, but is anemic with a hemoglobin of 9.6, MCV is 89. He additionally was found to have leukocytosis with a white blood cell count of 15.5 including reported left shift and rare blasts. Bone marrow evaluation is now performed to evaluate for disease progression. This was performed by Dr. Thakur.   Final Diagnosis   PERIPHERAL BLOOD SMEAR, CBC DATA, BONE MARROW ASPIRATE, CLOT  SECTION, AND BIOPSY WITH FLOW CYTOMETRY:  Hypercellular bone marrow with granulocytic hyperplasia and left shift.  1-2+ reticulin fibrosis with foci of 3+ fibrosis  See COMMENT  EDS/klb   Electronically signed by Evette Gaines MD on 12/15/2016 at 1513   Comment       The findings in this case are most compatible with an evolving post-polycythemic myelofibrosis. A small population of abnormal monocytes was detected by flow, likely representing the immature monocytic cells seen in the aspirate smears. This atypical population is most consistent with clonal evolution of the patient's myeloid neoplasm.             ASSESSMENT: The patient is a very pleasant 86-year-old gentleman with  MF post polycythemia vera, JAK2 positive.      PROBLEM LIST:   1. 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  DIPSS score of 5.   2. Leukocytosis  3. Anemia, Normocytic.     PLAN:   1.  I did go over the bone marrow biopsy result.  I explained to patient that he has myelofibrosis now.  His DIPSS score is 5 with age more than 65, white blood cells of 40,000, Hgb of 9.6, circulating blast 1%, and fatigue.   2.  I recommend to start the patient on Jakafi 20 mg BID, this is the appropriate dose since he has normal platelets counts. I shared with the patient that this is FDA approved treatment, does help with the patient constitutional symptoms as well as a blood counts.  Farthermore studies have shown Jakafi can improve survival in patients with intermediate or high risk myelofibrosis.  3. I will go ahead and refer the patient to see. Tafferi at [facility] for second opinion. I will hold off on starting Jakafi until he gets evaluated at [facility] for possible clinical trials.  Patient is scheduled to see Dr. Ponce on January 25, 2017.  I discussed the case with Dr. Ponce to coordinate patient care.  4. I will go ahead and get the drug approved for now.  5.  The patient was made aware about potential side  effects of Jakafi including shingles or other atypical infections reactivation, hypercholesterolemia, and thrombocytopenia.  6.  The patient will follow-up with me in 2 weeks with repeat CBC.    Leny Thakur MD  1/13/2017

## 2017-01-13 NOTE — LETTER
January 13, 2017     Andrea Murguia MD  [address]  [place] [address]    Patient: Neil Houser   YOB: 1929   Date of Visit: 1/13/2017       Dear Dr. Blade MD:    Neil Houser was in my office today. Below is a copy of my note.    If you have questions, please do not hesitate to call me. I look forward to following Neil along with you.         Sincerely,        Leny Thakur MD        CC: No Recipients    DATE OF VISIT: 1/13/2017     REASON FOR VISIT: Follow-up for MF post polycythemia vera.      HISTORY OF PRESENT ILLNESS: The patient is a very pleasant 86-year-old  gentleman with past medical history significant for polycythemia vera, JAK2  positive. The patient has been receiving phlebotomy periodically; currently he  is on once every 3 months targeting hematocrit less than 45.  The patient presented with fatigue and weakness found to have new onset anemia with leukocytosis.  Bone marrow biopsy done December 12, 2016 came back consistent with myelofibrosis.  The patient is  here today with a new complain.     SUBJECTIVE: The patient has been anxious about his new diagnosis of myelofibrosis.  His biggest complaint is fatigue this has been going on for the last few months.  He does feel a bit better after blood transfusion. He denied any headaches,  denied any night sweats, no diarrhea, no fever or chills.      PAST MEDICAL HISTORY/SOCIAL HISTORY/FAMILY HISTORY: Unchanged from my prior  documentation done 02/06/2012.     Review of Systems   Constitutional: Positive for fatigue. Negative for activity change, appetite change, chills, fever and unexpected weight change.   HENT: Negative for hearing loss, mouth sores, nosebleeds, sore throat and trouble swallowing.    Eyes: Negative for visual disturbance.   Respiratory: Negative for cough, chest tightness, shortness of breath and wheezing.    Cardiovascular: Negative for chest pain, palpitations and leg swelling.    "  Gastrointestinal: Negative for abdominal distention, abdominal pain, blood in stool, constipation, diarrhea, nausea, rectal pain and vomiting.   Endocrine: Negative for cold intolerance and heat intolerance.   Genitourinary: Negative for difficulty urinating, dysuria, frequency and urgency.   Musculoskeletal: Negative for arthralgias, back pain, gait problem, joint swelling and myalgias.   Skin: Negative for rash.   Neurological: Negative for dizziness, tremors, syncope, weakness, light-headedness, numbness and headaches.   Hematological: Negative for adenopathy. Does not bruise/bleed easily.   Psychiatric/Behavioral: Negative for confusion, sleep disturbance and suicidal ideas. The patient is not nervous/anxious.          Current Outpatient Prescriptions:   •  Multiple Vitamins-Minerals (MULTIVITAL PO), Take  by mouth., Disp: , Rfl:   •  MYRBETRIQ 25 MG tablet sustained-release 24 hour, TK 1 T PO QHS, Disp: , Rfl: 2  •  Nutritional Supplements (NUTRITIONAL SUPPLEMENT PO), Take  by mouth. multiple, Disp: , Rfl:   •  pioglitazone (ACTOS) 45 MG tablet, TK 1 T PO QAM, Disp: , Rfl: 1  •  ramipril (ALTACE) 10 MG capsule, TK 1 C PO BID, Disp: , Rfl: 3  •  rosuvastatin (CRESTOR) 10 MG tablet, TK 1 T PO QD, Disp: , Rfl: 1  •  ruxolitinib (JAKAFI) 20 MG chemo tablet, Take 1 tablet by mouth 2 (Two) Times a Day., Disp: 60 tablet, Rfl: 5  •  valACYclovir (VALTREX) 1000 MG tablet, TK 1 T PO BID, Disp: , Rfl: 0    PHYSICAL EXAMINATION:   Visit Vitals   • /61   • Pulse 74   • Temp 97.2 °F (36.2 °C) (Temporal Artery )   • Resp 18   • Ht 72\" (182.9 cm)   • Wt 183 lb (83 kg)   • BMI 24.82 kg/m2      ECOG Performance Status: 1 - Symptomatic but completely ambulatory  General Appearance:  alert, cooperative, no apparent distress and appears stated age   Neurologic/Psychiatric: A&O x 3, gait steady, appropriate affect, strength 5/5 in all muscle groups   HEENT:  Normocephalic, without obvious abnormality, mucous membranes moist "   Neck: Supple, symmetrical, trachea midline, no adenopathy;  No thyromegaly, masses, or tenderness   Lungs:   Clear to auscultation bilaterally; respirations regular, even, and unlabored bilaterally   Heart:  Regular rate and rhythm, no murmurs appreciated   Abdomen:   Soft, non-tender, non-distended and no organomegaly   Lymph nodes: No cervical, supraclavicular, inguinal or axillary adenopathy noted   Extremities: Normal, atraumatic; no clubbing, cyanosis, or edema    Skin: No rashes, ulcers, or suspicious lesions noted     Infusion on 01/12/2017   Component Date Value Ref Range Status   • ABO Type 01/12/2017 A   Final   • RH type 01/12/2017 Positive   Final   • Antibody Screen 01/12/2017 Negative   Final   • Product Code 01/13/2017 [id number]   Final   • Unit Number 01/13/2017 [id number]   Final   • UNIT  ABO 01/13/2017 A   Final   • UNIT  RH 01/13/2017 POS   Final   • CROSSMATCH 1 INTERPRETATION 01/13/2017 Compatible   Final   • Dispense Status 01/13/2017 PT   Final   • Blood Type 01/13/2017 APOS   Final   • Blood Expiration Date 01/13/2017 201701172359   Final   • Blood Type Barcode 01/13/2017 6200   Final   • Product Code 01/13/2017 [id number]   Final   • Unit Number 01/13/2017 [id number]   Final   • UNIT  ABO 01/13/2017 A   Final   • UNIT  RH 01/13/2017 POS   Final   • CROSSMATCH 1 INTERPRETATION 01/13/2017 Compatible   Final   • Dispense Status 01/13/2017 PT   Final   • Blood Type 01/13/2017 APOS   Final   • Blood Expiration Date 01/13/2017 201701172359   Final   • Blood Type Barcode 01/13/2017 6200   Final   • ABO Type 01/12/2017 A   Final   • RH type 01/12/2017 Positive   Final        No results found.Bone Marrow Aspiration & Exam   Order: [id number]   Status:  Final result Visible to patient:  No (Not Released) Dx:  Polycythemia      7d ago     Clinical Information       The working history is a history of ANASTACIO-2 positive polycythemia vera. The patient has been receiving therapeutic phlebotomy with q  3 month blood draws. The patient has not had phlebotomies for the past 4 months, but is anemic with a hemoglobin of 9.6, MCV is 89. He additionally was found to have leukocytosis with a white blood cell count of 15.5 including reported left shift and rare blasts. Bone marrow evaluation is now performed to evaluate for disease progression. This was performed by Dr. Thakur.   Final Diagnosis   PERIPHERAL BLOOD SMEAR, CBC DATA, BONE MARROW ASPIRATE, CLOT SECTION, AND BIOPSY WITH FLOW CYTOMETRY:  Hypercellular bone marrow with granulocytic hyperplasia and left shift.  1-2+ reticulin fibrosis with foci of 3+ fibrosis  See COMMENT  EDS/klb   Electronically signed by Evette Gaines MD on 12/15/2016 at 1513   Comment       The findings in this case are most compatible with an evolving post-polycythemic myelofibrosis. A small population of abnormal monocytes was detected by flow, likely representing the immature monocytic cells seen in the aspirate smears. This atypical population is most consistent with clonal evolution of the patient's myeloid neoplasm.             ASSESSMENT: The patient is a very pleasant 86-year-old gentleman with  MF post polycythemia vera, JAK2 positive.      PROBLEM LIST:   1. 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  DIPSS score of 5.   2. Leukocytosis  3. Anemia, Normocytic.     PLAN:   1.  I did go over the bone marrow biopsy result.  I explained to patient that he has myelofibrosis now.  His DIPSS score is 5 with age more than 65, white blood cells of 40,000, Hgb of 9.6, circulating blast 1%, and fatigue.   2.  I recommend to start the patient on Jakafi 20 mg BID, this is the appropriate dose since he has normal platelets counts. I shared with the patient that this is FDA approved treatment, does help with the patient constitutional symptoms as well as a blood counts.  Farthermore studies have shown Jakafi can improve survival in patients with intermediate or high risk  myelofibrosis.  3. I will go ahead and refer the patient to see. Tafferi at [facility] for second opinion. I will hold off on starting Jakafi until he gets evaluated at [facility] for possible clinical trials.  Patient is scheduled to see Dr. Ponce on January 25, 2017.  I discussed the case with Dr. Ponce to coordinate patient care.  4. I will go ahead and get the drug approved for now.  5.  The patient was made aware about potential side effects of Jakafi including shingles or other atypical infections reactivation, hypercholesterolemia, and thrombocytopenia.  6.  The patient will follow-up with me in 2 weeks with repeat CBC.    Leny Thakur MD  1/13/2017

## 2017-01-16 ENCOUNTER — TELEPHONE (OUTPATIENT)
Dept: ONCOLOGY | Facility: CLINIC | Age: 82
End: 2017-01-16

## 2017-01-16 NOTE — TELEPHONE ENCOUNTER
----- Message from Holly Carrillo sent at 1/16/2017  2:43 PM EST -----  Regarding: BADIN-DRUG GETTING APPROVED  Contact: [phone]  Nicholas Ingram called he needs to talk to Lorie about getting the drug getting approved and what tone care to look patient is going to need in th future?

## 2017-01-16 NOTE — TELEPHONE ENCOUNTER
Left VM advising that i will check to see if Dr Thakur wants patient to start this now vs after he is seen by [facility]. We will plan follow ups and care at that visit

## 2017-01-17 ENCOUNTER — TELEPHONE (OUTPATIENT)
Dept: ONCOLOGY | Facility: CLINIC | Age: 82
End: 2017-01-17

## 2017-01-17 NOTE — TELEPHONE ENCOUNTER
----- Message from Chrissie Bishop sent at 1/17/2017 11:38 AM EST -----  Regarding: BADIN/ FUTURE CARE  CALL MR HIGGINS  CONCERNING FUTURE CARE

## 2017-01-18 ENCOUNTER — TELEPHONE (OUTPATIENT)
Dept: ONCOLOGY | Facility: CLINIC | Age: 82
End: 2017-01-18

## 2017-01-18 NOTE — TELEPHONE ENCOUNTER
----- Message from Cindy Pathak MA sent at 1/18/2017 11:36 AM EST -----  Regarding: Ofe- questions about future care  Contact: [phone]  Don Conway states that the pt has questions about his future care options. Should he look into getting private care taker, etc? Pt has upcoming apt at [facility], but he is wanting to shed some light on his future options for care.

## 2017-01-18 NOTE — TELEPHONE ENCOUNTER
Pt wanted to know if he would need any assistance down the road for his care. Advised that we do refer to hospice for assistance if needed. Can discuss all options at f/u appointment on 1/30/17

## 2017-01-24 ENCOUNTER — TELEPHONE (OUTPATIENT)
Dept: ONCOLOGY | Facility: CLINIC | Age: 82
End: 2017-01-24

## 2017-01-24 NOTE — TELEPHONE ENCOUNTER
----- Message from Holly Carrillo sent at 1/24/2017 11:28 AM EST -----  Regarding: BADIN- QUESTIONS  Contact: [phone]  Nicholas called patient is getting ready to go to [place] to his appointment has some questions on the stem cell transplant?

## 2017-01-24 NOTE — TELEPHONE ENCOUNTER
IAM left advising that [place] office staff had told us he is too old for a transplant when we made the appointment, but they would be able to answer his questions more thoroughly at [facility]

## 2017-01-27 ENCOUNTER — TELEPHONE (OUTPATIENT)
Dept: ONCOLOGY | Facility: CLINIC | Age: 82
End: 2017-01-27

## 2017-01-27 NOTE — TELEPHONE ENCOUNTER
----- Message from Holly Carrillo sent at 1/27/2017 11:26 AM EST -----  Regarding: BADIN-LABS  Contact: [phone]  Nicholas called and patient was seen at [facility] yesterday and had blood tests yesterday there does he still need to come in early Monday and get labs?

## 2017-01-27 NOTE — TELEPHONE ENCOUNTER
Nicholas states that patient had lab work done at [place] yesterday and did not really want to be stuck again. Advised that per note, it looks like Dr Thakur just wanted a CBC done, but if patient does not want to have it drawn we can see if it is necessary and draw it after the appt if necessary

## 2017-01-30 ENCOUNTER — OFFICE VISIT (OUTPATIENT)
Dept: ONCOLOGY | Facility: CLINIC | Age: 82
End: 2017-01-30

## 2017-01-30 VITALS
BODY MASS INDEX: 24.65 KG/M2 | SYSTOLIC BLOOD PRESSURE: 92 MMHG | HEIGHT: 72 IN | TEMPERATURE: 97 F | RESPIRATION RATE: 18 BRPM | HEART RATE: 80 BPM | WEIGHT: 182 LBS | DIASTOLIC BLOOD PRESSURE: 56 MMHG

## 2017-01-30 DIAGNOSIS — D75.81 MYELOFIBROSIS (HCC): Primary | ICD-10-CM

## 2017-01-30 PROCEDURE — 99215 OFFICE O/P EST HI 40 MIN: CPT | Performed by: INTERNAL MEDICINE

## 2017-01-30 NOTE — PROGRESS NOTES
DATE OF VISIT: 1/30/2017     REASON FOR VISIT: Follow-up for MF post polycythemia vera.      HISTORY OF PRESENT ILLNESS: The patient is a very pleasant 86-year-old  gentleman with past medical history significant for polycythemia vera, JAK2  positive. The patient has been receiving phlebotomy periodically; currently he  is on once every 3 months targeting hematocrit less than 45.  The patient presented with fatigue and weakness found to have new onset anemia with leukocytosis.  Bone marrow biopsy done December 12, 2016 came back consistent with myelofibrosis.  The patient is  here today with a new complain.     SUBJECTIVE: The patient has been anxious about his new diagnosis of myelofibrosis.  His biggest complaint is fatigue this has been going on for the last few months.  He did not feel different after blood transfusion. He denied any headaches,  denied any night sweats, no diarrhea, no fever or chills.      PAST MEDICAL HISTORY/SOCIAL HISTORY/FAMILY HISTORY: Unchanged from my prior  documentation done 02/06/2012.     Review of Systems   Constitutional: Positive for fatigue. Negative for activity change, appetite change, chills, fever and unexpected weight change.   HENT: Negative for hearing loss, mouth sores, nosebleeds, sore throat and trouble swallowing.    Eyes: Negative for visual disturbance.   Respiratory: Negative for cough, chest tightness, shortness of breath and wheezing.    Cardiovascular: Negative for chest pain, palpitations and leg swelling.   Gastrointestinal: Negative for abdominal distention, abdominal pain, blood in stool, constipation, diarrhea, nausea, rectal pain and vomiting.   Endocrine: Negative for cold intolerance and heat intolerance.   Genitourinary: Negative for difficulty urinating, dysuria, frequency and urgency.   Musculoskeletal: Negative for arthralgias, back pain, gait problem, joint swelling and myalgias.   Skin: Negative for rash.   Neurological: Negative for dizziness,  "tremors, syncope, weakness, light-headedness, numbness and headaches.   Hematological: Negative for adenopathy. Does not bruise/bleed easily.   Psychiatric/Behavioral: Negative for confusion, sleep disturbance and suicidal ideas. The patient is not nervous/anxious.          Current Outpatient Prescriptions:   •  Multiple Vitamins-Minerals (MULTIVITAL PO), Take  by mouth., Disp: , Rfl:   •  MYRBETRIQ 25 MG tablet sustained-release 24 hour, TK 1 T PO QHS, Disp: , Rfl: 2  •  Nutritional Supplements (NUTRITIONAL SUPPLEMENT PO), Take  by mouth. multiple, Disp: , Rfl:   •  pioglitazone (ACTOS) 45 MG tablet, TK 1 T PO QAM, Disp: , Rfl: 1  •  ramipril (ALTACE) 10 MG capsule, TK 1 C PO BID, Disp: , Rfl: 3  •  rosuvastatin (CRESTOR) 10 MG tablet, TK 1 T PO QD, Disp: , Rfl: 1  •  ruxolitinib (JAKAFI) 20 MG chemo tablet, Take 1 tablet by mouth 2 (Two) Times a Day., Disp: 60 tablet, Rfl: 5  •  valACYclovir (VALTREX) 1000 MG tablet, TK 1 T PO BID, Disp: , Rfl: 0    PHYSICAL EXAMINATION:   Visit Vitals   • BP 92/56   • Pulse 80   • Temp 97 °F (36.1 °C) (Temporal Artery )   • Resp 18   • Ht 72\" (182.9 cm)   • Wt 182 lb (82.6 kg)   • BMI 24.68 kg/m2      ECOG Performance Status: 1 - Symptomatic but completely ambulatory  General Appearance:  alert, cooperative, no apparent distress and appears stated age   Neurologic/Psychiatric: A&O x 3, gait steady, appropriate affect, strength 5/5 in all muscle groups   HEENT:  Normocephalic, without obvious abnormality, mucous membranes moist   Neck: Supple, symmetrical, trachea midline, no adenopathy;  No thyromegaly, masses, or tenderness   Lungs:   Clear to auscultation bilaterally; respirations regular, even, and unlabored bilaterally   Heart:  Regular rate and rhythm, no murmurs appreciated   Abdomen:   Soft, non-tender, non-distended and no organomegaly   Lymph nodes: No cervical, supraclavicular, inguinal or axillary adenopathy noted   Extremities: Normal, atraumatic; no clubbing, " cyanosis, or edema    Skin: No rashes, ulcers, or suspicious lesions noted     No visits with results within 2 Week(s) from this visit.  Latest known visit with results is:    Infusion on 01/12/2017   Component Date Value Ref Range Status   • ABO Type 01/12/2017 A   Final   • RH type 01/12/2017 Positive   Final   • Antibody Screen 01/12/2017 Negative   Final   • Product Code 01/13/2017 [id number]   Final   • Unit Number 01/13/2017 [id number]   Final   • UNIT  ABO 01/13/2017 A   Final   • UNIT  RH 01/13/2017 POS   Final   • CROSSMATCH 1 INTERPRETATION 01/13/2017 Compatible   Final   • Dispense Status 01/13/2017 PT   Final   • Blood Type 01/13/2017 APOS   Final   • Blood Expiration Date 01/13/2017 201701172359   Final   • Blood Type Barcode 01/13/2017 6200   Final   • Product Code 01/13/2017 [id number]   Final   • Unit Number 01/13/2017 [id number]   Final   • UNIT  ABO 01/13/2017 A   Final   • UNIT  RH 01/13/2017 POS   Final   • CROSSMATCH 1 INTERPRETATION 01/13/2017 Compatible   Final   • Dispense Status 01/13/2017 PT   Final   • Blood Type 01/13/2017 APOS   Final   • Blood Expiration Date 01/13/2017 201701172359   Final   • Blood Type Barcode 01/13/2017 6200   Final   • ABO Type 01/12/2017 A   Final   • RH type 01/12/2017 Positive   Final        No results found.Bone Marrow Aspiration & Exam   Order: [id number]   Status:  Final result Visible to patient:  No (Not Released) Dx:  Polycythemia      7d ago     Clinical Information       The working history is a history of ANASTACIO-2 positive polycythemia vera. The patient has been receiving therapeutic phlebotomy with q 3 month blood draws. The patient has not had phlebotomies for the past 4 months, but is anemic with a hemoglobin of 9.6, MCV is 89. He additionally was found to have leukocytosis with a white blood cell count of 15.5 including reported left shift and rare blasts. Bone marrow evaluation is now performed to evaluate for disease progression. This was  performed by Dr. Thakur.   Final Diagnosis   PERIPHERAL BLOOD SMEAR, CBC DATA, BONE MARROW ASPIRATE, CLOT SECTION, AND BIOPSY WITH FLOW CYTOMETRY:  Hypercellular bone marrow with granulocytic hyperplasia and left shift.  1-2+ reticulin fibrosis with foci of 3+ fibrosis  See COMMENT  EDS/klb   Electronically signed by Evette Gaines MD on 12/15/2016 at 1513   Comment       The findings in this case are most compatible with an evolving post-polycythemic myelofibrosis. A small population of abnormal monocytes was detected by flow, likely representing the immature monocytic cells seen in the aspirate smears. This atypical population is most consistent with clonal evolution of the patient's myeloid neoplasm.             ASSESSMENT: The patient is a very pleasant 86-year-old gentleman with  MF post polycythemia vera, JAK2 positive.      PROBLEM LIST:   1. 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  DIPSS score of 5.   2. Leukocytosis  3. Anemia, Normocytic.     PLAN:   1.  I did go over the bone marrow biopsy result.  I explained to patient that he has myelofibrosis now.  His DIPSS score is 5 with age more than 65, white blood cells of 40,000, Hgb of 9.6, circulating blast 1%, and fatigue.   2.  I recommend to start the patient on Jakafi 20 mg BID, this is the appropriate dose since he has normal platelets counts. I shared with the patient that this is FDA approved treatment, does help with the patient constitutional symptoms as well as a blood counts.  Farthermore studies have shown Jakafi can improve survival in patients with intermediate or high risk myelofibrosis.  3.  The patient had already met with Dr. Ponce at [facility] for second opinion. Dr. Ponce recommended to try erythropoietin, androgen replacement, prednisone, and may be thalidomide.  If this does not work then possibly Jakafi versus clinical trial.  With full respect to Dr Ponce, I disagree with his recommendation.  Studies  have shown Jakafi compared with best available treatment it does decrease transfusion needs as well as improve patient quality of life and most importantly improves survival.  Patient does not have an enlarged spleen but this is not the reason why I would like the patient to be on Jakafi.  4. I will go ahead and get the drug approved for now.  5.  I will go ahead and send the patient for a third opinion at [facility] based on his own wishes.  6.  I asked the patient don't start  Jakafi until he is evaluated at [facility].  The patient was made aware about potential side effects of Jakafi including shingles or other atypical infections reactivation, hypercholesterolemia, and thrombocytopenia.  7.  The patient will follow-up with me in 2 weeks with repeat CBC.  8.  I had long discussions today with the patient his girlfriend as well as his medical assistant.  I addressed all their questions.    Leny Thakur MD  1/30/2017

## 2017-01-30 NOTE — LETTER
January 30, 2017     Andrea Murguia MD  [address]  [place] [facility] [place] [address]    Patient: Neil Houser   YOB: 1929   Date of Visit: 1/30/2017       Dear Dr. Blade MD:    Neil Houser was in my office today. Below is a copy of my note.    If you have questions, please do not hesitate to call me. I look forward to following Neil along with you.         Sincerely,        Leny Thakur MD        CC: No Recipients    DATE OF VISIT: 1/30/2017     REASON FOR VISIT: Follow-up for MF post polycythemia vera.      HISTORY OF PRESENT ILLNESS: The patient is a very pleasant 86-year-old  gentleman with past medical history significant for polycythemia vera, JAK2  positive. The patient has been receiving phlebotomy periodically; currently he  is on once every 3 months targeting hematocrit less than 45.  The patient presented with fatigue and weakness found to have new onset anemia with leukocytosis.  Bone marrow biopsy done December 12, 2016 came back consistent with myelofibrosis.  The patient is  here today with a new complain.     SUBJECTIVE: The patient has been anxious about his new diagnosis of myelofibrosis.  His biggest complaint is fatigue this has been going on for the last few months.  He did not feel different after blood transfusion. He denied any headaches,  denied any night sweats, no diarrhea, no fever or chills.      PAST MEDICAL HISTORY/SOCIAL HISTORY/FAMILY HISTORY: Unchanged from my prior  documentation done 02/06/2012.     Review of Systems   Constitutional: Positive for fatigue. Negative for activity change, appetite change, chills, fever and unexpected weight change.   HENT: Negative for hearing loss, mouth sores, nosebleeds, sore throat and trouble swallowing.    Eyes: Negative for visual disturbance.   Respiratory: Negative for cough, chest tightness, shortness of breath and wheezing.    Cardiovascular: Negative for chest pain, palpitations and leg swelling.    "  Gastrointestinal: Negative for abdominal distention, abdominal pain, blood in stool, constipation, diarrhea, nausea, rectal pain and vomiting.   Endocrine: Negative for cold intolerance and heat intolerance.   Genitourinary: Negative for difficulty urinating, dysuria, frequency and urgency.   Musculoskeletal: Negative for arthralgias, back pain, gait problem, joint swelling and myalgias.   Skin: Negative for rash.   Neurological: Negative for dizziness, tremors, syncope, weakness, light-headedness, numbness and headaches.   Hematological: Negative for adenopathy. Does not bruise/bleed easily.   Psychiatric/Behavioral: Negative for confusion, sleep disturbance and suicidal ideas. The patient is not nervous/anxious.          Current Outpatient Prescriptions:   •  Multiple Vitamins-Minerals (MULTIVITAL PO), Take  by mouth., Disp: , Rfl:   •  MYRBETRIQ 25 MG tablet sustained-release 24 hour, TK 1 T PO QHS, Disp: , Rfl: 2  •  Nutritional Supplements (NUTRITIONAL SUPPLEMENT PO), Take  by mouth. multiple, Disp: , Rfl:   •  pioglitazone (ACTOS) 45 MG tablet, TK 1 T PO QAM, Disp: , Rfl: 1  •  ramipril (ALTACE) 10 MG capsule, TK 1 C PO BID, Disp: , Rfl: 3  •  rosuvastatin (CRESTOR) 10 MG tablet, TK 1 T PO QD, Disp: , Rfl: 1  •  ruxolitinib (JAKAFI) 20 MG chemo tablet, Take 1 tablet by mouth 2 (Two) Times a Day., Disp: 60 tablet, Rfl: 5  •  valACYclovir (VALTREX) 1000 MG tablet, TK 1 T PO BID, Disp: , Rfl: 0    PHYSICAL EXAMINATION:   Visit Vitals   • BP 92/56   • Pulse 80   • Temp 97 °F (36.1 °C) (Temporal Artery )   • Resp 18   • Ht 72\" (182.9 cm)   • Wt 182 lb (82.6 kg)   • BMI 24.68 kg/m2      ECOG Performance Status: 1 - Symptomatic but completely ambulatory  General Appearance:  alert, cooperative, no apparent distress and appears stated age   Neurologic/Psychiatric: A&O x 3, gait steady, appropriate affect, strength 5/5 in all muscle groups   HEENT:  Normocephalic, without obvious abnormality, mucous membranes moist "   Neck: Supple, symmetrical, trachea midline, no adenopathy;  No thyromegaly, masses, or tenderness   Lungs:   Clear to auscultation bilaterally; respirations regular, even, and unlabored bilaterally   Heart:  Regular rate and rhythm, no murmurs appreciated   Abdomen:   Soft, non-tender, non-distended and no organomegaly   Lymph nodes: No cervical, supraclavicular, inguinal or axillary adenopathy noted   Extremities: Normal, atraumatic; no clubbing, cyanosis, or edema    Skin: No rashes, ulcers, or suspicious lesions noted     No visits with results within 2 Week(s) from this visit.  Latest known visit with results is:    Infusion on 01/12/2017   Component Date Value Ref Range Status   • ABO Type 01/12/2017 A   Final   • RH type 01/12/2017 Positive   Final   • Antibody Screen 01/12/2017 Negative   Final   • Product Code 01/13/2017 [id number]   Final   • Unit Number 01/13/2017 [id number]   Final   • UNIT  ABO 01/13/2017 A   Final   • UNIT  RH 01/13/2017 POS   Final   • CROSSMATCH 1 INTERPRETATION 01/13/2017 Compatible   Final   • Dispense Status 01/13/2017 PT   Final   • Blood Type 01/13/2017 APOS   Final   • Blood Expiration Date 01/13/2017 201701172359   Final   • Blood Type Barcode 01/13/2017 6200   Final   • Product Code 01/13/2017 [id number]   Final   • Unit Number 01/13/2017 [id number]   Final   • UNIT  ABO 01/13/2017 A   Final   • UNIT  RH 01/13/2017 POS   Final   • CROSSMATCH 1 INTERPRETATION 01/13/2017 Compatible   Final   • Dispense Status 01/13/2017 PT   Final   • Blood Type 01/13/2017 APOS   Final   • Blood Expiration Date 01/13/2017 201701172359   Final   • Blood Type Barcode 01/13/2017 6200   Final   • ABO Type 01/12/2017 A   Final   • RH type 01/12/2017 Positive   Final        No results found.Bone Marrow Aspiration & Exam   Order: [id number]   Status:  Final result Visible to patient:  No (Not Released) Dx:  Polycythemia      7d ago     Clinical Information       The working history is a history  of ANASTACIO-2 positive polycythemia vera. The patient has been receiving therapeutic phlebotomy with q 3 month blood draws. The patient has not had phlebotomies for the past 4 months, but is anemic with a hemoglobin of 9.6, MCV is 89. He additionally was found to have leukocytosis with a white blood cell count of 15.5 including reported left shift and rare blasts. Bone marrow evaluation is now performed to evaluate for disease progression. This was performed by Dr. Thakur.   Final Diagnosis   PERIPHERAL BLOOD SMEAR, CBC DATA, BONE MARROW ASPIRATE, CLOT SECTION, AND BIOPSY WITH FLOW CYTOMETRY:  Hypercellular bone marrow with granulocytic hyperplasia and left shift.  1-2+ reticulin fibrosis with foci of 3+ fibrosis  See COMMENT  EDS/klb   Electronically signed by Evette Gaines MD on 12/15/2016 at 1513   Comment       The findings in this case are most compatible with an evolving post-polycythemic myelofibrosis. A small population of abnormal monocytes was detected by flow, likely representing the immature monocytic cells seen in the aspirate smears. This atypical population is most consistent with clonal evolution of the patient's myeloid neoplasm.             ASSESSMENT: The patient is a very pleasant 86-year-old gentleman with  MF post polycythemia vera, JAK2 positive.      PROBLEM LIST:   1. 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  DIPSS score of 5.   2. Leukocytosis  3. Anemia, Normocytic.     PLAN:   1.  I did go over the bone marrow biopsy result.  I explained to patient that he has myelofibrosis now.  His DIPSS score is 5 with age more than 65, white blood cells of 40,000, Hgb of 9.6, circulating blast 1%, and fatigue.   2.  I recommend to start the patient on Jakafi 20 mg BID, this is the appropriate dose since he has normal platelets counts. I shared with the patient that this is FDA approved treatment, does help with the patient constitutional symptoms as well as a blood counts.   Farthermore studies have shown Jakafi can improve survival in patients with intermediate or high risk myelofibrosis.  3.  The patient had already met with Dr. Ponce at [facility] for second opinion. Dr. Ponce recommended to try erythropoietin, androgen replacement, prednisone, and may be thalidomide.  If this does not work then possibly Jakafi versus clinical trial.  With full respect to Dr Ponce, I disagree with his recommendation.  Studies have shown Jakafi compared with best available treatment it does decrease transfusion needs as well as improve patient quality of life and most importantly improves survival.  Patient does not have an enlarged spleen but this is not the reason why I would like the patient to be on Jakafi.  4. I will go ahead and get the drug approved for now.  5.  I will go ahead and send the patient for a third opinion at [facility] based on his own wishes.  6.  I asked the patient don't start  Jakafi until he is evaluated at [facility].  The patient was made aware about potential side effects of Jakafi including shingles or other atypical infections reactivation, hypercholesterolemia, and thrombocytopenia.  7.  The patient will follow-up with me in 2 weeks with repeat CBC.    Leny Thakur MD  1/30/2017

## 2017-01-30 NOTE — MR AVS SNAPSHOT
"                        Neil Houser   1/30/2017 10:30 AM   Office Visit    Dept Phone:  [phone]   Encounter #:  [id number]    Provider:  Leny Thakur MD   Department:  [facility] HEMATOLOGY  AND ONCOLOGY                Your Full Care Plan              Your Updated Medication List          This list is accurate as of: 1/30/17 11:06 AM.  Always use your most recent med list.                MULTIVITAL PO       MYRBETRIQ 25 MG tablet sustained-release 24 hour   Generic drug:  Mirabegron ER       NUTRITIONAL SUPPLEMENT PO       pioglitazone 45 MG tablet   Commonly known as:  ACTOS       ramipril 10 MG capsule   Commonly known as:  ALTACE       rosuvastatin 10 MG tablet   Commonly known as:  CRESTOR       ruxolitinib 20 MG chemo tablet   Commonly known as:  JAKAFI   Take 1 tablet by mouth 2 (Two) Times a Day.       valACYclovir 1000 MG tablet   Commonly known as:  VALTREX               We Performed the Following     Ambulatory Referral to Oncology       Instructions     None    Patient Instructions History      MyChart Signup     Our records indicate that you have [facility]. If you would like to sign up for [facility], please email [facility] [email or web address] or call [phone] to obtain an activation code.             Other Info from Your Visit           Your appointments     Date & Time Provider Appointment Department    Feb 10, 2017  9:30 AM EST Leny Thakur MD FOLLOW UP [facility] HEMATOLOGY  AND ONCOLOGY        [facility] HEMATOLOGY  AND ONCOLOGY  [address], [address] [address]  [phone]          Vital Signs     Blood Pressure Pulse Temperature Respirations Height Weight    92/56 80 97 °F (36.1 °C) (Temporal Artery ) 18 72\" (182.9 cm) 182 lb (82.6 kg)    Body Mass Index Smoking Status                24.68 kg/m2 Never Smoker          Problems and Diagnoses Noted     Bone marrow " fibrosis

## 2017-01-31 ENCOUNTER — TELEPHONE (OUTPATIENT)
Dept: ONCOLOGY | Facility: CLINIC | Age: 82
End: 2017-01-31

## 2017-01-31 NOTE — TELEPHONE ENCOUNTER
----- Message from Holly Carrillo sent at 1/31/2017  9:12 AM EST -----  Regarding: ELIDA-QUESTIONS  Contact: [phone]  Nicholas called and has questions about his appointment at [facility]?

## 2017-02-03 PROCEDURE — 88313 SPECIAL STAINS GROUP 2: CPT | Performed by: INTERNAL MEDICINE

## 2017-02-08 ENCOUNTER — TELEPHONE (OUTPATIENT)
Dept: ONCOLOGY | Facility: CLINIC | Age: 82
End: 2017-02-08

## 2017-02-08 NOTE — TELEPHONE ENCOUNTER
Patient was seen last Friday at [facility] and had 17 vials of labs drawn. Nicholas has a copy of all of the results that he will bring by the office today. Advised that we will not likely need to draw more blood, and that the outside results should be sufficient, but will look over everything tomorrow when back in [place] and let them know if they do need to have anything done prior to his appointment on Friday with Dr Thakur

## 2017-02-08 NOTE — TELEPHONE ENCOUNTER
----- Message from Chrissie Bishop sent at 2/8/2017  8:36 AM EST -----  Regarding: jenifer  Call [phone] appt Friday morning does he need labs.  He just had some done Friday at [facility].  17 valves.    calll gaurav  [phone]

## 2017-02-09 NOTE — TELEPHONE ENCOUNTER
IAM left advising that we received all lab results, and there should be no need for patient to come in early tomorrow before appt to get additional labs drawn

## 2017-02-10 ENCOUNTER — OFFICE VISIT (OUTPATIENT)
Dept: ONCOLOGY | Facility: CLINIC | Age: 82
End: 2017-02-10

## 2017-02-10 VITALS
HEART RATE: 67 BPM | DIASTOLIC BLOOD PRESSURE: 72 MMHG | WEIGHT: 183 LBS | RESPIRATION RATE: 14 BRPM | BODY MASS INDEX: 24.79 KG/M2 | HEIGHT: 72 IN | SYSTOLIC BLOOD PRESSURE: 131 MMHG | TEMPERATURE: 97.2 F

## 2017-02-10 DIAGNOSIS — D75.81 MYELOFIBROSIS (HCC): Primary | ICD-10-CM

## 2017-02-10 PROCEDURE — 99214 OFFICE O/P EST MOD 30 MIN: CPT | Performed by: INTERNAL MEDICINE

## 2017-02-10 NOTE — PROGRESS NOTES
DATE OF VISIT: 2/10/2017     REASON FOR VISIT: Follow-up for MF post polycythemia vera.      HISTORY OF PRESENT ILLNESS: The patient is a very pleasant 86-year-old  gentleman with past medical history significant for polycythemia vera, JAK2  positive. The patient has been receiving phlebotomy periodically; currently he  is on once every 3 months targeting hematocrit less than 45.  The patient presented with fatigue and weakness found to have new onset anemia with leukocytosis.  Bone marrow biopsy done December 12, 2016 came back consistent with myelofibrosis.  The patient is  here today for scheduled follow-up visit.     SUBJECTIVE: [facility] new diagnosis of [facility].  His biggest complaint is fatigue this has been going on for the last few months.  He did not feel different after blood transfusion. He denied any headaches,  denied any night sweats, no diarrhea, no fever or chills.      PAST MEDICAL HISTORY/SOCIAL HISTORY/FAMILY HISTORY: Unchanged from my prior  documentation done 02/06/2012.     Review of Systems   Constitutional: Positive for fatigue. Negative for activity change, appetite change, chills, fever and unexpected weight change.   HENT: Negative for hearing loss, mouth sores, nosebleeds, sore throat and trouble swallowing.    Eyes: Negative for visual disturbance.   Respiratory: Negative for cough, chest tightness, shortness of breath and wheezing.    Cardiovascular: Negative for chest pain, palpitations and leg swelling.   Gastrointestinal: Negative for abdominal distention, abdominal pain, blood in stool, constipation, diarrhea, nausea, rectal pain and vomiting.   Endocrine: Negative for cold intolerance and heat intolerance.   Genitourinary: Negative for difficulty urinating, dysuria, frequency and urgency.   Musculoskeletal: Negative for arthralgias, back pain, gait problem, joint swelling and myalgias.   Skin: Negative for  "rash.   Neurological: Negative for dizziness, tremors, syncope, weakness, light-headedness, numbness and headaches.   Hematological: Negative for adenopathy. Does not bruise/bleed easily.   Psychiatric/Behavioral: Negative for confusion, sleep disturbance and suicidal ideas. The patient is not nervous/anxious.          Current Outpatient Prescriptions:   •  Multiple Vitamins-Minerals (MULTIVITAL PO), Take  by mouth., Disp: , Rfl:   •  MYRBETRIQ 25 MG tablet sustained-release 24 hour, TK 1 T PO QHS, Disp: , Rfl: 2  •  Nutritional Supplements (NUTRITIONAL SUPPLEMENT PO), Take  by mouth. multiple, Disp: , Rfl:   •  pioglitazone (ACTOS) 45 MG tablet, TK 1 T PO QAM, Disp: , Rfl: 1  •  ramipril (ALTACE) 10 MG capsule, TK 1 C PO BID, Disp: , Rfl: 3  •  rosuvastatin (CRESTOR) 10 MG tablet, TK 1 T PO QD, Disp: , Rfl: 1  •  ruxolitinib (JAKAFI) 20 MG chemo tablet, Take 1 tablet by mouth 2 (Two) Times a Day., Disp: 60 tablet, Rfl: 5    PHYSICAL EXAMINATION:   Visit Vitals   • /72   • Pulse 67   • Temp 97.2 °F (36.2 °C) (Temporal Artery )   • Resp 14   • Ht 72\" (182.9 cm)   • Wt 183 lb (83 kg)   • BMI 24.82 kg/m2      ECOG Performance Status: 1 - Symptomatic but completely ambulatory  General Appearance:  alert, cooperative, no apparent distress and appears stated age   Neurologic/Psychiatric: A&O x 3, gait steady, appropriate affect, strength 5/5 in all muscle groups   HEENT:  Normocephalic, without obvious abnormality, mucous membranes moist   Neck: Supple, symmetrical, trachea midline, no adenopathy;  No thyromegaly, masses, or tenderness   Lungs:   Clear to auscultation bilaterally; respirations regular, even, and unlabored bilaterally   Heart:  Regular rate and rhythm, no murmurs appreciated   Abdomen:   Soft, non-tender, non-distended and no organomegaly   Lymph nodes: No cervical, supraclavicular, inguinal or axillary adenopathy noted   Extremities: Normal, atraumatic; no clubbing, cyanosis, or edema    Skin: No " rashes, ulcers, or suspicious lesions noted     No visits with results within 2 Week(s) from this visit.  Latest known visit with results is:    Infusion on 01/12/2017   Component Date Value Ref Range Status   • ABO Type 01/12/2017 A   Final   • RH type 01/12/2017 Positive   Final   • Antibody Screen 01/12/2017 Negative   Final   • Product Code 01/13/2017 [id number]   Final   • Unit Number 01/13/2017 [id number]   Final   • UNIT  ABO 01/13/2017 A   Final   • UNIT  RH 01/13/2017 POS   Final   • CROSSMATCH 1 INTERPRETATION 01/13/2017 Compatible   Final   • Dispense Status 01/13/2017 PT   Final   • Blood Type 01/13/2017 APOS   Final   • Blood Expiration Date 01/13/2017 201701172359   Final   • Blood Type Barcode 01/13/2017 6200   Final   • Product Code 01/13/2017 [id number]   Final   • Unit Number 01/13/2017 [id number]   Final   • UNIT  ABO 01/13/2017 A   Final   • UNIT  RH 01/13/2017 POS   Final   • CROSSMATCH 1 INTERPRETATION 01/13/2017 Compatible   Final   • Dispense Status 01/13/2017 PT   Final   • Blood Type 01/13/2017 APOS   Final   • Blood Expiration Date 01/13/2017 201701172359   Final   • Blood Type Barcode 01/13/2017 6200   Final   • ABO Type 01/12/2017 A   Final   • RH type 01/12/2017 Positive   Final        No results found.Bone Marrow Aspiration & Exam   Order: [id number]   Status:  Final result Visible to patient:  No (Not Released) Dx:  Polycythemia      7d ago     Clinical Information       The working history is a history of ANASTACIO-2 positive polycythemia vera. The patient has been receiving therapeutic phlebotomy with q 3 month blood draws. The patient has not had phlebotomies for the past 4 months, but is anemic with a hemoglobin of 9.6, MCV is 89. He additionally was found to have leukocytosis with a white blood cell count of 15.5 including reported left shift and rare blasts. Bone marrow evaluation is now performed to evaluate for disease progression. This was performed by Dr. Thakur.   Final  Diagnosis   PERIPHERAL BLOOD SMEAR, CBC DATA, BONE MARROW ASPIRATE, CLOT SECTION, AND BIOPSY WITH FLOW CYTOMETRY:  Hypercellular bone marrow with granulocytic hyperplasia and left shift.  1-2+ reticulin fibrosis with foci of 3+ fibrosis  See COMMENT  EDS/klb   Electronically signed by Evette Gaines MD on 12/15/2016 at 1513   Comment       The findings in this case are most compatible with an evolving post-polycythemic myelofibrosis. A small population of abnormal monocytes was detected by flow, likely representing the immature monocytic cells seen in the aspirate smears. This atypical population is most consistent with clonal evolution of the patient's myeloid neoplasm.             ASSESSMENT: The patient is a very pleasant 86-year-old gentleman with  MF post polycythemia vera, JAK2 positive.      PROBLEM LIST:   1. 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  DIPSS score of 5.   2. Leukocytosis  3. Anemia, Normocytic.     PLAN:   1.  I did go over the bone marrow biopsy result.  I explained to patient that he has myelofibrosis now.  His DIPSS score is 5 with age more than 65, white blood cells of 40,000, Hgb of 9.6, circulating blast 1%, and fatigue.   2.  I recommend to start the patient on Jakafi 20 mg BID, this is the appropriate dose since he has normal platelets counts. I shared with the patient that this is FDA approved treatment, does help with the patient constitutional symptoms as well as a blood counts.  Farthermore studies have shown Jakafi can improve survival in patients with intermediate or high risk myelofibrosis.  3.  The patient had already met with Dr. Ponce at [facility] for second opinion. Dr. Ponce recommended to try erythropoietin, androgen replacement, prednisone, and may be thalidomide.  If this does not work then possibly Jakafi versus clinical trial.  With full respect to Dr Ponce, I disagree with his recommendation.  Studies have shown Jakafi compared with  best available treatment it does decrease transfusion needs as well as improve patient quality of life and most importantly improves survival.  Patient does not have an enlarged spleen but this is not the reason why I would like the patient to be on Jakafi.  4. I will go ahead and get the drug approved for now.  5.  I will follow-up on [facility] recommendations.  6.  I asked the patient don't start  Jakafi until he is evaluated at [facility].  The patient was made aware about potential side effects of Jakafi including shingles or other atypical infections reactivation, hypercholesterolemia, and thrombocytopenia.  7.  The patient will follow-up with me in 8 weeks with repeat CBC.  8.  I had long discussions today with the patient his girlfriend as well as his medical assistant.  I addressed all their multiple questions.    Leny Thakur MD  2/10/2017

## 2017-02-20 ENCOUNTER — TELEPHONE (OUTPATIENT)
Dept: ONCOLOGY | Facility: CLINIC | Age: 82
End: 2017-02-20

## 2017-02-20 NOTE — TELEPHONE ENCOUNTER
----- Message from Holly Carrillo sent at 2/20/2017  8:33 AM EST -----  Regarding: ELIDA-PHONE CALL  Contact: [phone]  Nicholas called and needs to talk to Lorie about visit with MD Zurita.

## 2017-02-20 NOTE — TELEPHONE ENCOUNTER
Med records informed to send bone marrow results to Dr Babb (?) with [facility], to compare their results to ours

## 2017-02-21 ENCOUNTER — TELEPHONE (OUTPATIENT)
Dept: ONCOLOGY | Facility: CLINIC | Age: 82
End: 2017-02-21

## 2017-02-21 NOTE — TELEPHONE ENCOUNTER
----- Message from Holly Carrillo sent at 2/21/2017 10:41 AM EST -----  Regarding: BADIN-RESULTS  Contact: [phone]  Nicholas called he needs to talk to her about some text results from [place]? Please call.

## 2017-02-21 NOTE — TELEPHONE ENCOUNTER
Nicholas states that patient received test results that were performed at [facility], but no one has gone over the results with him yet. Advised to try and get in contact with them re results again, and can bring them to appt on Friday as well if he'd like. Appt made for Friday 1115 per patient request to go over recommendations from [facility] Zurita

## 2017-02-22 ENCOUNTER — TELEPHONE (OUTPATIENT)
Dept: ONCOLOGY | Facility: CLINIC | Age: 82
End: 2017-02-22

## 2017-02-22 NOTE — TELEPHONE ENCOUNTER
IAM left advising that per our pathology department, the doctor requesting this with [facility] Zur[facility] would need to contact our path dept to obtain the bm bx slides. Path number left for pt to pass on to MD Zurita

## 2017-02-22 NOTE — TELEPHONE ENCOUNTER
----- Message from Holly Carrillo sent at 2/22/2017 12:26 PM EST -----  Regarding: BADIN-SLIDES  Contact: [phone]  Nicholas called and he needs to get the actual slides from the bone marrow test how would they go about getting those?

## 2017-02-24 ENCOUNTER — TELEPHONE (OUTPATIENT)
Dept: ONCOLOGY | Facility: CLINIC | Age: 82
End: 2017-02-24

## 2017-02-24 NOTE — TELEPHONE ENCOUNTER
Advised slides have been requested and were in the process of being sent to [facility]. Advised to keep MD Parminder labs if needed, but does not need to have CBC checked by us until April per last office note

## 2017-02-24 NOTE — TELEPHONE ENCOUNTER
----- Message from Holly ANTONIO Carrillo sent at 2/24/2017 11:46 AM EST -----  Regarding: ELIDA-QUESTIONS  Contact: [phone]  Nicholas called with the following questions will patient need a CBC when he comes back for follow up? He had this done with Dr. Murguia's office about 10 days ago and will probably get them done at [facility]. Also the Myleo Fibrosis slides that were requested have they been sent to [facility]?

## 2017-02-24 NOTE — TELEPHONE ENCOUNTER
----- Message from Holly Carrillo sent at 2/24/2017  8:21 AM EST -----  Regarding: ELIDA-PHONE CALL  Contact: [phone]  Nicholas called and cancelled appointment today he needs to talk to Lorie about some developments.

## 2017-02-28 ENCOUNTER — TELEPHONE (OUTPATIENT)
Dept: ONCOLOGY | Facility: CLINIC | Age: 82
End: 2017-02-28

## 2017-02-28 NOTE — TELEPHONE ENCOUNTER
Nicholas states is dropping of the results of patient's genetic testing that was done with [facility] today for Dr Thakur to look at for his appt on 3/6/17

## 2017-02-28 NOTE — TELEPHONE ENCOUNTER
----- Message from Chrissie Bishop sent at 2/28/2017 11:01 AM EST -----  Regarding: BADIN     GENETIC TESTING RESULTS  Contact: [phone]  PLEASE CALL EUGENIO HWANG CONCERNING  GENETIC TESTING RESULTS.  [phone]

## 2017-03-06 ENCOUNTER — OFFICE VISIT (OUTPATIENT)
Dept: ONCOLOGY | Facility: CLINIC | Age: 82
End: 2017-03-06

## 2017-03-06 VITALS
SYSTOLIC BLOOD PRESSURE: 122 MMHG | HEART RATE: 69 BPM | RESPIRATION RATE: 15 BRPM | BODY MASS INDEX: 25.5 KG/M2 | WEIGHT: 188 LBS | TEMPERATURE: 97.7 F | DIASTOLIC BLOOD PRESSURE: 61 MMHG

## 2017-03-06 DIAGNOSIS — D75.81 MYELOFIBROSIS (HCC): Primary | ICD-10-CM

## 2017-03-06 PROCEDURE — 99214 OFFICE O/P EST MOD 30 MIN: CPT | Performed by: INTERNAL MEDICINE

## 2017-03-06 NOTE — PROGRESS NOTES
DATE OF VISIT: 3/6/2017     REASON FOR VISIT: Follow-up for MF post polycythemia vera.      HISTORY OF PRESENT ILLNESS: The patient is a very pleasant 86-year-old  gentleman with past medical history significant for polycythemia vera, JAK2  positive. The patient has been receiving phlebotomy periodically; currently he  is on once every 3 months targeting hematocrit less than 45.  The patient presented with fatigue and weakness found to have new onset anemia with leukocytosis.  Bone marrow biopsy done December 12, 2016 came back consistent with myelofibrosis.  The patient is  here today for scheduled follow-up visit.     SUBJECTIVE: [facility] his new diagnosis of [facility]ofibrosis.  His fatigue is much better.  He did not feel different after blood transfusion. He denied any headaches,  denied any night sweats, no diarrhea, no fever or chills.      PAST MEDICAL HISTORY/SOCIAL HISTORY/FAMILY HISTORY: Unchanged from my prior  documentation done 02/06/2012.     Review of Systems   Constitutional: Positive for fatigue. Negative for activity change, appetite change, chills, fever and unexpected weight change.   HENT: Negative for hearing loss, mouth sores, nosebleeds, sore throat and trouble swallowing.    Eyes: Negative for visual disturbance.   Respiratory: Negative for cough, chest tightness, shortness of breath and wheezing.    Cardiovascular: Negative for chest pain, palpitations and leg swelling.   Gastrointestinal: Negative for abdominal distention, abdominal pain, blood in stool, constipation, diarrhea, nausea, rectal pain and vomiting.   Endocrine: Negative for cold intolerance and heat intolerance.   Genitourinary: Negative for difficulty urinating, dysuria, frequency and urgency.   Musculoskeletal: Negative for arthralgias, back pain, gait problem, joint swelling and myalgias.   Skin: Negative for rash.   Neurological: Negative for dizziness,  tremors, syncope, weakness, light-headedness, numbness and headaches.   Hematological: Negative for adenopathy. Does not bruise/bleed easily.   Psychiatric/Behavioral: Negative for confusion, sleep disturbance and suicidal ideas. The patient is not nervous/anxious.          Current Outpatient Prescriptions:   •  Multiple Vitamins-Minerals (MULTIVITAL PO), Take  by mouth., Disp: , Rfl:   •  MYRBETRIQ 25 MG tablet sustained-release 24 hour, TK 1 T PO QHS, Disp: , Rfl: 2  •  Nutritional Supplements (NUTRITIONAL SUPPLEMENT PO), Take  by mouth. multiple, Disp: , Rfl:   •  pioglitazone (ACTOS) 45 MG tablet, TK 1 T PO QAM, Disp: , Rfl: 1  •  ramipril (ALTACE) 10 MG capsule, TK 1 C PO BID, Disp: , Rfl: 3  •  rosuvastatin (CRESTOR) 10 MG tablet, TK 1 T PO QD, Disp: , Rfl: 1  •  ruxolitinib (JAKAFI) 20 MG chemo tablet, Take 1 tablet by mouth 2 (Two) Times a Day., Disp: 60 tablet, Rfl: 5    PHYSICAL EXAMINATION:   Visit Vitals   • /61   • Pulse 69   • Temp 97.7 °F (36.5 °C) (Temporal Artery )   • Resp 15   • Wt 188 lb (85.3 kg)   • BMI 25.5 kg/m2      ECOG Performance Status: 1 - Symptomatic but completely ambulatory  General Appearance:  alert, cooperative, no apparent distress and appears stated age   Neurologic/Psychiatric: A&O x 3, gait steady, appropriate affect, strength 5/5 in all muscle groups   HEENT:  Normocephalic, without obvious abnormality, mucous membranes moist   Neck: Supple, symmetrical, trachea midline, no adenopathy;  No thyromegaly, masses, or tenderness   Lungs:   Clear to auscultation bilaterally; respirations regular, even, and unlabored bilaterally   Heart:  Regular rate and rhythm, no murmurs appreciated   Abdomen:   Soft, non-tender, non-distended and no organomegaly   Lymph nodes: No cervical, supraclavicular, inguinal or axillary adenopathy noted   Extremities: Normal, atraumatic; no clubbing, cyanosis, or edema    Skin: No rashes, ulcers, or suspicious lesions noted     No visits with  results within 2 Week(s) from this visit.  Latest known visit with results is:    Infusion on 01/12/2017   Component Date Value Ref Range Status   • ABO Type 01/12/2017 A   Final   • RH type 01/12/2017 Positive   Final   • Antibody Screen 01/12/2017 Negative   Final   • Product Code 01/13/2017 [id number]   Final   • Unit Number 01/13/2017 [id number]   Final   • UNIT  ABO 01/13/2017 A   Final   • UNIT  RH 01/13/2017 POS   Final   • CROSSMATCH 1 INTERPRETATION 01/13/2017 Compatible   Final   • Dispense Status 01/13/2017 PT   Final   • Blood Type 01/13/2017 APOS   Final   • Blood Expiration Date 01/13/2017 201701172359   Final   • Blood Type Barcode 01/13/2017 6200   Final   • Product Code 01/13/2017 [id number]   Final   • Unit Number 01/13/2017 [id number]   Final   • UNIT  ABO 01/13/2017 A   Final   • UNIT  RH 01/13/2017 POS   Final   • CROSSMATCH 1 INTERPRETATION 01/13/2017 Compatible   Final   • Dispense Status 01/13/2017 PT   Final   • Blood Type 01/13/2017 APOS   Final   • Blood Expiration Date 01/13/2017 201701172359   Final   • Blood Type Barcode 01/13/2017 6200   Final   • ABO Type 01/12/2017 A   Final   • RH type 01/12/2017 Positive   Final        No results found.Bone Marrow Aspiration & Exam   Order: [id number]   Status:  Final result Visible to patient:  No (Not Released) Dx:  Polycythemia      7d ago     Clinical Information       The working history is a history of ANASTACIO-2 positive polycythemia vera. The patient has been receiving therapeutic phlebotomy with q 3 month blood draws. The patient has not had phlebotomies for the past 4 months, but is anemic with a hemoglobin of 9.6, MCV is 89. He additionally was found to have leukocytosis with a white blood cell count of 15.5 including reported left shift and rare blasts. Bone marrow evaluation is now performed to evaluate for disease progression. This was performed by Dr. Thakur.   Final Diagnosis   PERIPHERAL BLOOD SMEAR, CBC DATA, BONE MARROW ASPIRATE,  CLOT SECTION, AND BIOPSY WITH FLOW CYTOMETRY:  Hypercellular bone marrow with granulocytic hyperplasia and left shift.  1-2+ reticulin fibrosis with foci of 3+ fibrosis  See COMMENT  EDS/klb   Electronically signed by Evette Gaines MD on 12/15/2016 at 1513   Comment       The findings in this case are most compatible with an evolving post-polycythemic myelofibrosis. A small population of abnormal monocytes was detected by flow, likely representing the immature monocytic cells seen in the aspirate smears. This atypical population is most consistent with clonal evolution of the patient's myeloid neoplasm.             ASSESSMENT: The patient is a very pleasant 86-year-old gentleman with  MF post polycythemia vera, JAK2 positive.      PROBLEM LIST:   1. 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  DIPSS score of 5.   2. Leukocytosis  3. Anemia, Normocytic.     PLAN:   1.  I will continue danazol per LOWE[facility] Zurita as well as [facility] recommendation.  Patient hemoglobin is up to 11.  2.  Will monitor patient's CBC once a month  3.  Patient will come back and see me in 2 months.  4.  We'll continue aspirin daily  5.  We'll consider starting patient on Jakafi if needed.   6.  The patient is scheduled to follow-up with LOWELL Zurita in 3 months.    Leny Thakur MD  3/6/2017

## 2017-04-03 NOTE — TELEPHONE ENCOUNTER
----- Message from Holly Carrillo sent at 4/3/2017  3:26 PM EDT -----  Regarding: ELIDA-LAB RESULTS  Contact: [phone]  Nicholas called back and patient had CBC done and got results back and they are not sure how to read these and the levels so need some help with understanding results to see if he will need to give blood.

## 2017-04-03 NOTE — TELEPHONE ENCOUNTER
"Patient was concerned that his blood work from [facility] showed \"elevated numbers\". Hgb was around 13, and hct around 25. He states that MD Zurita did not want his hgb to be above 9. Advised that these levels are good, and he does not need to have phlebotomies done at this time.  "

## 2017-04-03 NOTE — TELEPHONE ENCOUNTER
----- Message from Chrissie Bishop sent at 4/3/2017  9:17 AM EDT -----  Regarding: BADIN    HEMOGLOBIN  Contact: [phone]  CALL CONCERNING LAB WORK   ASK FOR EUGENIO   QUESTIONING HEMOGLOBIN

## 2017-04-14 NOTE — PROGRESS NOTES
DATE OF VISIT: 4/14/2017     REASON FOR VISIT: Follow-up for MF post polycythemia vera.      HISTORY OF PRESENT ILLNESS: The patient is a very pleasant 86-year-old  gentleman with past medical history significant for polycythemia vera, JAK2  positive. The patient has been receiving phlebotomy periodically; currently he  is on once every 3 months targeting hematocrit less than 45.  The patient presented with fatigue and weakness found to have new onset anemia with leukocytosis.  Bone marrow biopsy done December 12, 2016 came back consistent with myelofibrosis.  The patient is  here today for scheduled follow-up visit.     SUBJECTIVE: The patient is feeling really well.  He denied any fatigue no fever chills denied any weight loss his energy as well as appetite had improved.  He denied any headaches,  denied any night sweats, no diarrhea.      PAST MEDICAL HISTORY/SOCIAL HISTORY/FAMILY HISTORY: Unchanged from my prior  documentation done 02/06/2012.     Review of Systems   Constitutional: Positive for fatigue. Negative for activity change, appetite change, chills, fever and unexpected weight change.   HENT: Negative for hearing loss, mouth sores, nosebleeds, sore throat and trouble swallowing.    Eyes: Negative for visual disturbance.   Respiratory: Negative for cough, chest tightness, shortness of breath and wheezing.    Cardiovascular: Negative for chest pain, palpitations and leg swelling.   Gastrointestinal: Negative for abdominal distention, abdominal pain, blood in stool, constipation, diarrhea, nausea, rectal pain and vomiting.   Endocrine: Negative for cold intolerance and heat intolerance.   Genitourinary: Negative for difficulty urinating, dysuria, frequency and urgency.   Musculoskeletal: Negative for arthralgias, back pain, gait problem, joint swelling and myalgias.   Skin: Negative for rash.   Neurological: Negative for dizziness, tremors, syncope, weakness, light-headedness, numbness and headaches.  "  Hematological: Negative for adenopathy. Does not bruise/bleed easily.   Psychiatric/Behavioral: Negative for confusion, sleep disturbance and suicidal ideas. The patient is not nervous/anxious.          Current Outpatient Prescriptions:   •  allopurinol (ZYLOPRIM) 300 MG tablet, TK 1 T PO QD, Disp: , Rfl: 3  •  clopidogrel (PLAVIX) 75 MG tablet, TK 1 T PO D, Disp: , Rfl: 3  •  danazol (DANOCRINE) 50 MG capsule, TK ONE C PO QD, Disp: , Rfl: 0  •  esomeprazole (nexIUM) 40 MG capsule, TK 1 C PO QD, Disp: , Rfl: 5  •  eszopiclone (LUNESTA) 3 MG tablet, TK 1 T PO QHS, Disp: , Rfl: 3  •  Insulin Syringe 31G X 5/16\" 1 ML misc, USE 1 Q OTHER WEEK, Disp: , Rfl: 3  •  Multiple Vitamins-Minerals (MULTIVITAL PO), Take  by mouth., Disp: , Rfl:   •  MUSE 250 MCG pellet, USE AS DIRECTED, Disp: , Rfl: 6  •  MYRBETRIQ 25 MG tablet sustained-release 24 hour, TK 1 T PO QHS, Disp: , Rfl: 2  •  Nutritional Supplements (NUTRITIONAL SUPPLEMENT PO), Take  by mouth. multiple, Disp: , Rfl:   •  pioglitazone (ACTOS) 45 MG tablet, TK 1 T PO QAM, Disp: , Rfl: 1  •  ramipril (ALTACE) 10 MG capsule, TK 1 C PO BID, Disp: , Rfl: 3  •  rosuvastatin (CRESTOR) 10 MG tablet, TK 1 T PO QD, Disp: , Rfl: 1  •  sucralfate (CARAFATE) 1 G tablet, TK 1 T QID, Disp: , Rfl: 3    PHYSICAL EXAMINATION:   /59  Pulse 70  Temp 98.7 °F (37.1 °C)  Resp 16  Ht 72\" (182.9 cm)  Wt 191 lb (86.6 kg)  BMI 25.9 kg/m2   ECOG Performance Status: 1 - Symptomatic but completely ambulatory  General Appearance:  alert, cooperative, no apparent distress and appears stated age   Neurologic/Psychiatric: A&O x 3, gait steady, appropriate affect, strength 5/5 in all muscle groups   HEENT:  Normocephalic, without obvious abnormality, mucous membranes moist   Neck: Supple, symmetrical, trachea midline, no adenopathy;  No thyromegaly, masses, or tenderness   Lungs:   Clear to auscultation bilaterally; respirations regular, even, and unlabored bilaterally   Heart:  Regular " rate and rhythm, no murmurs appreciated   Abdomen:   Soft, non-tender, non-distended and no organomegaly   Lymph nodes: No cervical, supraclavicular, inguinal or axillary adenopathy noted   Extremities: Normal, atraumatic; no clubbing, cyanosis, or edema    Skin: No rashes, ulcers, or suspicious lesions noted     No visits with results within 2 Week(s) from this visit.  Latest known visit with results is:    Infusion on 01/12/2017   Component Date Value Ref Range Status   • ABO Type 01/12/2017 A   Final   • RH type 01/12/2017 Positive   Final   • Antibody Screen 01/12/2017 Negative   Final   • Product Code 01/13/2017 Q1410O12   Final   • Unit Number 01/13/2017 [id number]   Final   • UNIT  ABO 01/13/2017 A   Final   • UNIT  RH 01/13/2017 POS   Final   • CROSSMATCH 1 INTERPRETATION 01/13/2017 Compatible   Final   • Dispense Status 01/13/2017 PT   Final   • Blood Type 01/13/2017 APOS   Final   • Blood Expiration Date 01/13/2017 201701172359   Final   • Blood Type Barcode 01/13/2017 6200   Final   • Product Code 01/13/2017 [id number]   Final   • Unit Number 01/13/2017 [id number]   Final   • UNIT  ABO 01/13/2017 A   Final   • UNIT  RH 01/13/2017 POS   Final   • CROSSMATCH 1 INTERPRETATION 01/13/2017 Compatible   Final   • Dispense Status 01/13/2017 PT   Final   • Blood Type 01/13/2017 APOS   Final   • Blood Expiration Date 01/13/2017 201701172359   Final   • Blood Type Barcode 01/13/2017 6200   Final   • ABO Type 01/12/2017 A   Final   • RH type 01/12/2017 Positive   Final        No results found.Bone Marrow Aspiration & Exam   Order: [id number]   Status:  Final result Visible to patient:  No (Not Released) Dx:  Polycythemia      7d ago     Clinical Information       The working history is a history of ANASTACIO-2 positive polycythemia vera. The patient has been receiving therapeutic phlebotomy with q 3 month blood draws. The patient has not had phlebotomies for the past 4 months, but is anemic with a hemoglobin of 9.6,  MCV is 89. He additionally was found to have leukocytosis with a white blood cell count of 15.5 including reported left shift and rare blasts. Bone marrow evaluation is now performed to evaluate for disease progression. This was performed by Dr. Thakur.   Final Diagnosis   PERIPHERAL BLOOD SMEAR, CBC DATA, BONE MARROW ASPIRATE, CLOT SECTION, AND BIOPSY WITH FLOW CYTOMETRY:  Hypercellular bone marrow with granulocytic hyperplasia and left shift.  1-2+ reticulin fibrosis with foci of 3+ fibrosis  See COMMENT  EDS/klb   Electronically signed by Evette Gaines MD on 12/15/2016 at 1513   Comment       The findings in this case are most compatible with an evolving post-polycythemic myelofibrosis. A small population of abnormal monocytes was detected by flow, likely representing the immature monocytic cells seen in the aspirate smears. This atypical population is most consistent with clonal evolution of the patient's myeloid neoplasm.             ASSESSMENT: The patient is a very pleasant 86-year-old gentleman with  MF post polycythemia vera, JAK2 positive.      PROBLEM LIST:   1. 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  DIPSS score of 5.   2. Leukocytosis  3. Anemia, Normocytic.  4.  Hypogonadism     PLAN:   1.  I will continue danazol per [facility] as well as [facility] recommendation.  Patient hemoglobin is up to 13.4.  2.  Will monitor patient's CBC once a month  3.  Patient will come back and see me in 4 months.  4.  We'll continue aspirin daily  5.  We'll consider starting patient on Jakafi if needed.   6.  I asked the patient to go down on AndroGel from 3% to 2% and if he is still doing well we'll go down to the maintenance dose 1%.    Leny Thakur MD  4/14/2017

## 2017-06-05 NOTE — TELEPHONE ENCOUNTER
Returned phone call to patient's assistant, Bonifacio. Per Bonifacio Thakur was going to request slides from [facility] to compare to the slides we have at our lab. He would then schedule an apt with the patient to review results. He is calling for an update. I will check with Dr. Thakur and call him back.

## 2017-06-05 NOTE — TELEPHONE ENCOUNTER
----- Message from Chrissie Bishop sent at 6/5/2017  9:48 AM EDT -----  Regarding: ELIDA   WHEN TO TAKE TEST  Contact: [phone]  PLEASE CALL CONCNERING TEST   DR MARRERO WANTED TO DO SOME TEST AND COMPARE.  PLEASE CALL

## 2017-06-06 NOTE — TELEPHONE ENCOUNTER
Attempted to call Bonifacio back. Left VM. Told him that the slides were just requested, and it will take at minimum 10 business days to get back. We will be in contact once they have been received and reviewed by the pathologist.

## 2017-06-09 NOTE — TELEPHONE ENCOUNTER
Returned call to assistant Bonifacio. Told him no, the sides are not back yet. I will have Dr Thakur's regular nurse Lorie let them know once they have been received/ reviewed. He also wanted to know the month/year of patient's polycythemia vera diagnosis. I will check medical records for this information since it is pre-Epic.

## 2017-06-16 NOTE — TELEPHONE ENCOUNTER
----- Message from Chrissie Bishop sent at 6/16/2017  3:02 PM EDT -----  Regarding: jenifer   slides from [facility]  Contact: [phone]  Please call gaurav to let hiim know if you received the slides for this patient from [facility].

## 2017-06-19 NOTE — TELEPHONE ENCOUNTER
----- Message from Chrissie Bishop sent at 6/19/2017 10:47 AM EDT -----  Regarding: ELIDA  PLEASE CALL  Contact: [phone]  PLEASE CALL CONCERNING THIS PATIENTS  MEDICATION REQUEST CHANGE  AND REQUEST FOR ADDITIONAL TESTING.

## 2017-06-19 NOTE — TELEPHONE ENCOUNTER
Stopping testosterone for one month to see if it helps to get his hgb/hct back to a lower level, and would like to have this rechecked in one month with our lab.Advised that I will place that order to have that done

## 2017-06-29 NOTE — TELEPHONE ENCOUNTER
States that patient has not had a bowel movement in 2 days, and is also nauseous and feels dehydrated. Advised to try some OTC miralax and stool softeners to see if it helps with his constipation, and can get him in for IVF today at 1400 per infusion. States they will keep that appointment, and call back if symptoms do not resolve. If needing to get in to be seen, advised can see PCP today or tomorrow, or Dr Thakur tomorrow when he is back in the office if unable to get into PCP.

## 2017-06-29 NOTE — TELEPHONE ENCOUNTER
"----- Message from Shukri Morin sent at 6/29/2017  8:30 AM EDT -----  Regarding: Ofe Melo patient with stomach pain  Contact: [phone]  Bonifacio Cano called wanted to talk about possible IV fluids and might need to see doctor. Patient is having pain in abdomen and says he is \"blocked\". Also wants to know if he should come in to see Dr. Thakur today.   "

## 2017-06-30 PROBLEM — K56.609 SBO (SMALL BOWEL OBSTRUCTION) (HCC): Status: ACTIVE | Noted: 2017-01-01

## 2017-06-30 NOTE — ANESTHESIA PROCEDURE NOTES
Peripheral Block    Patient location during procedure: pre-op  Start time: 6/30/2017 3:55 PM  Stop time: 6/30/2017 3:58 PM  Reason for block: at surgeon's request and post-op pain management  Performed by  Anesthesiologist: SUDARSHAN KENNY  Preanesthetic Checklist  Completed: patient identified, site marked, surgical consent, pre-op evaluation, timeout performed, IV checked, risks and benefits discussed and monitors and equipment checked  Peripheral Block Prep:  Sterile barriers:cap, gloves, sterile barriers and mask  Prep: ChloraPrep  Patient monitoring: blood pressure monitoring, continuous pulse oximetry and EKG  Peripheral Procedure  Guidance:ultrasound guided  Images:still images obtained    Laterality:Bilateral  Block Type:TAP  Injection Technique:single-shot  Needle Type:short-bevel  Needle Gauge:20 G    Medications  Comment:Block Injection:  LA dose divided between Right and Left block       Adjuncts:  Decadron 4mg PSF, Buprenex 0.3mg (Per total volume of LA)  Local Injected:bupivacaine 0.25% Local Amount Injected:60mL  Post Assessment  Injection Assessment: negative aspiration for heme, incremental injection and no paresthesia on injection  Patient Tolerance:comfortable throughout block  Complications:no  Additional Notes  The pt was placed in the Supine Position and was  anesthetized with:       General anesthesia.    Under Ultrasound guidance, a BBraun 4inch 360 degree needle was advanced with Normal Saline hydro dissection of tissue.  The Internal Oblique and Transversus Abdominus muscles where visualized.  At or before the aponeurosis of Internal Oblique, local anesthetic spread was visualized in the Transversus Abdominus Plane. Injection was made incrementally with aspiration every 5 mls.  There was no  intravascular injection,  injection pressure was normal, there was no neural injection, and the procedure was completed without difficulty.  Thank You.

## 2017-06-30 NOTE — TELEPHONE ENCOUNTER
----- Message from Isabel Najera sent at 6/30/2017  8:00 AM EDT -----  Regarding: ELIDA - PATIENT IS WORSE  Contact: [phone]  Nicholas Ingram called and said patient is worse than he was yesterday before his fluids and Zofran. He may need to go to the ER unless Dr. Thakur suggests different.     Please call

## 2017-06-30 NOTE — ANESTHESIA POSTPROCEDURE EVALUATION
Patient: Neil Houser    Procedure Summary     Date Anesthesia Start Anesthesia Stop Room / Location    06/30/17 1450 1616 [facility] OR 09 / [facility] OR       Procedure Diagnosis Surgeon Provider    LAPAROTOMY EXPLORATORY (N/A Abdomen) No diagnosis on file. MD Macario Solorio MD          Anesthesia Type: general  Last vitals  BP   133/77   Temp   97.5   Pulse  105   Resp   16   SpO2   100     Post Anesthesia Care and Evaluation    Patient location during evaluation: PACU  Patient participation: complete - patient participated  Level of consciousness: awake and alert  Pain management: adequate  Airway patency: patent  Anesthetic complications: No anesthetic complications  PONV Status: none  Cardiovascular status: hemodynamically stable and acceptable  Respiratory status: nonlabored ventilation, acceptable and nasal cannula  Hydration status: acceptable

## 2017-06-30 NOTE — ANESTHESIA PROCEDURE NOTES
Airway  Urgency: elective    Airway not difficult    General Information and Staff    Patient location during procedure: OR    Indications and Patient Condition  Indications for airway management: airway protection    Preoxygenated: yes  MILS not maintained throughout  Mask difficulty assessment: 1 - vent by mask    Final Airway Details  Final airway type: endotracheal airway      Successful airway: ETT  Cuffed: yes   Successful intubation technique: direct laryngoscopy  Facilitating devices/methods: cricoid pressure  Endotracheal tube insertion site: oral  Blade: Hoa  Blade size: #3  ETT size: 7.5 mm  Cormack-Lehane Classification: grade I - full view of glottis  Placement verified by: chest auscultation and capnometry   Number of attempts at approach: 1    Additional Comments  Negative epigastric sounds, Breath sound equal bilaterally with symmetric chest rise and fall

## 2017-06-30 NOTE — TELEPHONE ENCOUNTER
Nicholas states that patient saw Dr Murguia yesterday and he suggested that patient go to the ER. Having increased abdominal pain unrelieved by IVF or OTC medications. States that the last time the patient had these symptoms, he had to get his stomach pumped. Advised that he go to the ER for evaluation, and I will update Dr Thakur on status

## 2017-06-30 NOTE — ANESTHESIA PROCEDURE NOTES
Central Line    Patient location during procedure: OR  Start time: 6/30/2017 3:15 PM  Stop Time:6/30/2017 3:22 PM  Indications: vascular access  Staff  Anesthesiologist: SUDARSHAN KENNY  Preanesthetic Checklist  Completed: patient identified, site marked, surgical consent, pre-op evaluation, timeout performed, IV checked, risks and benefits discussed and monitors and equipment checked  Central Line Prep  Sterile Tech:cap, gloves, gown, mask and sterile barriers  Prep: chloraprep  Patient monitoring: blood pressure monitoring, continuous pulse oximetry and EKG  Central Line Procedure  Location:internal jugular  Catheter Type:MAC  Catheter Size:9 Fr  Guidance:ultrasound guided  PROCEDURE NOTE/ULTRASOUND INTERPRETATION.  Using ultrasound guidance the potential vascular sites for insertion of the catheter were visualized to determine the patency of the vessel to be used for vascular access.  After selecting the appropriate site for insertion, the needle was visualized under ultrasound being inserted into the internal jugular vein, followed by ultrasound confirmation of wire and catheter placement. There were no abnormalities seen on ultrasound; an image was taken; and the patient tolerated the procedure with no complications.   Assessment  Post procedure:biopatch applied, line sutured, occlusive dressing applied and secured with tape  Assessement:blood return through all ports, free fluid flow and chest x-ray ordered  Complications:no  Patient Tolerance:patient tolerated the procedure well with no apparent complications

## 2017-06-30 NOTE — ANESTHESIA PREPROCEDURE EVALUATION
Anesthesia Evaluation     Patient summary reviewed and Nursing notes reviewed   no history of anesthetic complications:  NPO Solid Status: > 8 hours  NPO Liquid Status: > 2 hours     Airway   Mallampati: III  TM distance: >3 FB  Neck ROM: full  possible difficult intubation  Dental - normal exam     Pulmonary - negative pulmonary ROS    breath sounds clear to auscultation  Cardiovascular   Exercise tolerance: poor (<4 METS)    Rhythm: regular  Rate: abnormal    (+) hypertension, hyperlipidemia      Neuro/Psych  (+) dementia,    GI/Hepatic/Renal/Endo    (+)  GERD well controlled, diabetes mellitus type 2 well controlled,     Musculoskeletal     Abdominal     Abdomen: soft.   Substance History      OB/GYN          Other   (+) blood dyscrasia     ROS/Med Hx Other: MILD THROMBOCYTOPENIA  LEUKOCYTOSIS  HX OF MYELOFIBROSIS                                Anesthesia Plan    ASA 4 - emergent     general     intravenous induction   Anesthetic plan and risks discussed with patient.    Plan discussed with CRNA.

## 2017-07-01 PROBLEM — N18.9 ACUTE ON CHRONIC RENAL FAILURE (HCC): Status: ACTIVE | Noted: 2017-01-01

## 2017-07-01 PROBLEM — N17.9 ACUTE ON CHRONIC RENAL FAILURE (HCC): Status: ACTIVE | Noted: 2017-01-01

## 2017-07-01 NOTE — PLAN OF CARE
Problem: Patient Care Overview (Adult)  Goal: Plan of Care Review  Outcome: Ongoing (interventions implemented as appropriate)    07/01/17 1813   Coping/Psychosocial Response Interventions   Plan Of Care Reviewed With patient   Patient Care Overview   Progress improving   Outcome Evaluation   Outcome Summary/Follow up Plan VSS, up to chair, ambulated halls, NG to LWS: 100ml output, remains NPO, TPN started per pharmacy/dietary, thiamine & PRN valium in case of alcohol withdrawal       Goal: Adult Individualization and Mutuality  Outcome: Ongoing (interventions implemented as appropriate)  Goal: Discharge Needs Assessment  Outcome: Ongoing (interventions implemented as appropriate)

## 2017-07-01 NOTE — CONSULTS
"Subjective     CHIEF COMPLAINT: Myelofibrosis    HISTORY OF PRESENT ILLNESS:  The patient is a 87 y.o. year old male, referred  Ms. Nitza Miller PEPPER, for Myelofibrosis with acute leukocytosis. The patient was in his usual state of health until day of admission he presented with abdominal pain, diffuse, worse periumbilical area, 9/10 in severity, radiates to lower abdomen,  associated with nausea and vomiting, never had this pain before, gets worse with food, improves with setting up.  He presented to [facility] emergency room yesterday June 30, 2017, CAT scan abdomen and pelvis revealed small bowel obstruction, blood work revealed leukocytosis of 52,000.  The patient had exploratory laparotomy with lysis of adhesions done emergently by Dr. Serrano yesterday.  The patient was admitted to the intensive care unit and I was consulted to further assist in his care.    REVIEW OF SYSTEMS:  A 14 point review of systems was performed and is negative except as noted above.    Past Medical History:   Diagnosis Date   • Mild cognitive impairment        No current facility-administered medications on file prior to encounter.      Current Outpatient Prescriptions on File Prior to Encounter   Medication Sig Dispense Refill   • clopidogrel (PLAVIX) 75 MG tablet TK 1 T PO D  3   • allopurinol (ZYLOPRIM) 300 MG tablet TK 1 T PO QD  3   • danazol (DANOCRINE) 50 MG capsule TK ONE C PO QD  0   • esomeprazole (nexIUM) 40 MG capsule TK 1 C PO QD  5   • eszopiclone (LUNESTA) 3 MG tablet TK 1 T PO QHS  3   • Insulin Syringe 31G X 5/16\" 1 ML misc USE 1 Q OTHER WEEK  3   • Multiple Vitamins-Minerals (MULTIVITAL PO) Take  by mouth.     • MUSE 250 MCG pellet USE AS DIRECTED  6   • MYRBETRIQ 25 MG tablet sustained-release 24 hour TK 1 T PO QHS  2   • Nutritional Supplements (NUTRITIONAL SUPPLEMENT PO) Take  by mouth. multiple     • oseltamivir (TAMIFLU) 75 MG capsule TK 1 C PO BID  0   • pioglitazone (ACTOS) 45 MG tablet TK 1 T PO " QAM  1   • ramipril (ALTACE) 10 MG capsule TK 1 C PO BID  3   • rosuvastatin (CRESTOR) 10 MG tablet TK 1 T PO QD  1   • sucralfate (CARAFATE) 1 G tablet TK 1 T QID  3   • tobramycin-dexamethasone (TOBRADEX) 0.3-0.1 % ophthalmic suspension INSTILL 1-2 DROPS IN AFFECTED EYES TID FOR 10 DAYS  0       No Known Allergies    History reviewed. No pertinent surgical history.    OB History   No data available       Social History     Social History   • Marital status:      Spouse name: N/A   • Number of children: N/A   • Years of education: N/A     Social History Main Topics   • Smoking status: Never Smoker   • Smokeless tobacco: Never Used   • Alcohol use Yes      Comment: occasional alcohol use   • Drug use: No   • Sexual activity: Not Asked     Other Topics Concern   • None     Social History Narrative       Family History   Problem Relation Age of Onset   • Alzheimer's disease Sister    • Heart disease Brother    • Cancer Brother        Objective     Vitals:    07/01/17 0830 07/01/17 0900 07/01/17 0930 07/01/17 1000   BP: 106/55 98/65 114/58 119/63   BP Location:       Patient Position:       Pulse: 87 84 86 89   Resp:  20     Temp:       TempSrc:       SpO2: 94% 94% 96% 94%   Weight:       Height:           General: well appearing male in no acute distress  HEENT: sclera anicteric, oropharynx clear  Lymphatics: no cervical, supraclavicular, or axillary adenopathy  Cardiovascular: regular rate and rhythm, no murmurs  Lungs: clear to auscultation bilaterally  Abdomen: soft, nontender, nondistended.  No palpable organomegaly  Extremeties: no lower extremity edema  Skin: no rashes, lesions, bruising, or petechiae      Admission on 06/30/2017   Component Date Value Ref Range Status   • Glucose 06/30/2017 174* 70 - 100 mg/dL Final   • BUN 06/30/2017 50* 9 - 23 mg/dL Final   • Creatinine 06/30/2017 3.00* 0.60 - 1.30 mg/dL Final   • Sodium 06/30/2017 136  132 - 146 mmol/L Final   • Potassium 06/30/2017 3.7  3.5 - 5.5  mmol/L Final   • Chloride 06/30/2017 89* 99 - 109 mmol/L Final   • CO2 06/30/2017 32.0* 20.0 - 31.0 mmol/L Final   • Calcium 06/30/2017 10.9* 8.7 - 10.4 mg/dL Final   • Total Protein 06/30/2017 7.7  5.7 - 8.2 g/dL Final   • Albumin 06/30/2017 4.90* 3.20 - 4.80 g/dL Final   • ALT (SGPT) 06/30/2017 25  7 - 40 U/L Final   • AST (SGOT) 06/30/2017 34* 0 - 33 U/L Final   • Alkaline Phosphatase 06/30/2017 61  25 - 100 U/L Final   • Total Bilirubin 06/30/2017 0.7  0.3 - 1.2 mg/dL Final   • eGFR Non African Amer 06/30/2017 20* >60 mL/min/1.73 Final   • Globulin 06/30/2017 2.8  gm/dL Final   • A/G Ratio 06/30/2017 1.8  1.5 - 2.5 g/dL Final   • BUN/Creatinine Ratio 06/30/2017 16.7  7.0 - 25.0 Final   • Anion Gap 06/30/2017 15.0* 3.0 - 11.0 mmol/L Final   • Lipase 06/30/2017 46  6 - 51 U/L Final   • Color, UA 06/30/2017 Dark Yellow* Yellow, Straw Final   • Appearance, UA 06/30/2017 Turbid* Clear Final   • pH, UA 06/30/2017 <=5.0  5.0 - 8.0 Final   • Specific Gravity, UA 06/30/2017 1.025  1.001 - 1.030 Final   • Glucose, UA 06/30/2017 Negative  Negative Final   • Ketones, UA 06/30/2017 Trace* Negative Final   • Bilirubin, UA 06/30/2017 Small (1+)* Negative Final   • Blood, UA 06/30/2017 Large (3+)* Negative Final   • Protein, UA 06/30/2017 100 mg/dL (2+)* Negative Final   • Leuk Esterase, UA 06/30/2017 Trace* Negative Final   • Nitrite, UA 06/30/2017 Negative  Negative Final   • Urobilinogen, UA 06/30/2017 0.2 E.U./dL  0.2 - 1.0 E.U./dL Final   • Lactate 06/30/2017 1.4  0.5 - 2.0 mmol/L Final    Falsely depressed results may occur on samples drawn from patients receiving N-Acetylcysteine (NAC) or Metamizole.   • Extra Tube 06/30/2017 hold for add-on   Final    Auto resulted   • Extra Tube 06/30/2017 Hold for add-ons.   Final    Auto resulted.   • Extra Tube 06/30/2017 hold for add-on   Final    Auto resulted   • Extra Tube 06/30/2017 Hold for add-ons.   Final    Auto resulted.   • WBC 06/30/2017 59.71* 3.50 - 10.80 10*3/mm3  Final   • RBC 06/30/2017 5.21  4.20 - 5.76 10*6/mm3 Final   • Hemoglobin 06/30/2017 16.4  13.1 - 17.5 g/dL Final   • Hematocrit 06/30/2017 47.0  38.9 - 50.9 % Final   • MCV 06/30/2017 90.2  80.0 - 99.0 fL Final   • MCH 06/30/2017 31.5* 27.0 - 31.0 pg Final   • MCHC 06/30/2017 34.9  32.0 - 36.0 g/dL Final   • RDW 06/30/2017 17.8* 11.3 - 14.5 % Final   • RDW-SD 06/30/2017 57.1* 37.0 - 54.0 fl Final   • Platelets 06/30/2017 142* 150 - 450 10*3/mm3 Final   • Neutrophil % 06/30/2017 74.0* 41.0 - 71.0 % Final   • Lymphocyte % 06/30/2017 3.0* 24.0 - 44.0 % Final   • Monocyte % 06/30/2017 6.0  0.0 - 12.0 % Final   • Eosinophil % 06/30/2017 0.0  0.0 - 3.0 % Final   • Basophil % 06/30/2017 0.0  0.0 - 1.0 % Final   • Bands %  06/30/2017 14.0* 0.0 - 5.0 % Final   • Metamyelocyte % 06/30/2017 1.0* 0.0 - 0.0 % Final   • Myelocyte % 06/30/2017 0.0  0.0 - 0.0 % Final   • Atypical Lymphocyte % 06/30/2017 2.0  0.0 - 5.0 % Final   • Neutrophils Absolute 06/30/2017 52.54* 1.50 - 8.30 10*3/mm3 Final   • Lymphocytes Absolute 06/30/2017 1.79  0.60 - 4.80 10*3/mm3 Final   • Monocytes Absolute 06/30/2017 3.58* 0.00 - 1.00 10*3/mm3 Final   • Eosinophils Absolute 06/30/2017 0.00* 0.10 - 0.30 10*3/mm3 Final   • Basophils Absolute 06/30/2017 0.00  0.00 - 0.20 10*3/mm3 Final   • RBC Morphology 06/30/2017 Normal  Normal Final   • WBC Morphology 06/30/2017 Normal  Normal Final   • Large Platelets 06/30/2017 Slight/1+  None Seen Final   • ABO Type 06/30/2017 A   Final   • RH type 06/30/2017 Positive   Final   • Antibody Screen 06/30/2017 Negative   Final   • Glucose 06/30/2017 158* 70 - 130 mg/dL Final   • RBC, UA 06/30/2017 Too Numerous to Count* None Seen, 0-2 /HPF Final   • WBC, UA 06/30/2017 3-5* None Seen /HPF Final   • Bacteria, UA 06/30/2017 1+* None Seen, Trace /HPF Final   • Squamous Epithelial Cells, UA 06/30/2017 3-6* None Seen, 0-2 /HPF Final   • Hyaline Casts, UA 06/30/2017 0-6  0 - 6 /LPF Final   • Fine Granular Casts, UA 06/30/2017  7-12  None Seen /LPF Final   • Methodology 06/30/2017 Manual Light Microscopy   Final   • Urine Culture 06/30/2017 No growth   Preliminary   • Lactate 06/30/2017 1.4  0.5 - 2.0 mmol/L Final    Falsely depressed results may occur on samples drawn from patients receiving N-Acetylcysteine (NAC) or Metamizole.   • Blood Culture 06/30/2017 No growth at less than 24 hours   Preliminary   • Blood Culture 06/30/2017 No growth at less than 24 hours   Preliminary   • Procalcitonin 06/30/2017 5.97  ng/mL Final   • Glucose 06/30/2017 112  70 - 130 mg/dL Final   • Lactate 07/01/2017 1.4  0.5 - 2.0 mmol/L Final    Falsely depressed results may occur on samples drawn from patients receiving N-Acetylcysteine (NAC) or Metamizole.   • Hemoglobin A1C 07/01/2017 5.70* 4.80 - 5.60 % Final   • Glucose 07/01/2017 114* 70 - 100 mg/dL Final   • BUN 07/01/2017 58* 9 - 23 mg/dL Final   • Creatinine 07/01/2017 2.80* 0.60 - 1.30 mg/dL Final   • Sodium 07/01/2017 140  132 - 146 mmol/L Final   • Potassium 07/01/2017 3.8  3.5 - 5.5 mmol/L Final   • Chloride 07/01/2017 101  99 - 109 mmol/L Final   • CO2 07/01/2017 31.0  20.0 - 31.0 mmol/L Final   • Calcium 07/01/2017 9.0  8.7 - 10.4 mg/dL Final   • Total Protein 07/01/2017 5.5* 5.7 - 8.2 g/dL Final   • Albumin 07/01/2017 3.60  3.20 - 4.80 g/dL Final   • ALT (SGPT) 07/01/2017 11  7 - 40 U/L Final   • AST (SGOT) 07/01/2017 26  0 - 33 U/L Final   • Alkaline Phosphatase 07/01/2017 36  25 - 100 U/L Final   • Total Bilirubin 07/01/2017 0.7  0.3 - 1.2 mg/dL Final   • eGFR Non African Amer 07/01/2017 22* >60 mL/min/1.73 Final   • Globulin 07/01/2017 1.9  gm/dL Final   • A/G Ratio 07/01/2017 1.9  1.5 - 2.5 g/dL Final   • BUN/Creatinine Ratio 07/01/2017 20.7  7.0 - 25.0 Final   • Anion Gap 07/01/2017 8.0  3.0 - 11.0 mmol/L Final   • Magnesium 07/01/2017 1.7  1.3 - 2.7 mg/dL Final   • Phosphorus 07/01/2017 3.8  2.4 - 5.1 mg/dL Final   • Procalcitonin 07/01/2017 5.28  ng/mL Final   • WBC 07/01/2017 63.02* 3.50  - 10.80 10*3/mm3 Final   • RBC 07/01/2017 4.05* 4.20 - 5.76 10*6/mm3 Final   • Hemoglobin 07/01/2017 12.4* 13.1 - 17.5 g/dL Final   • Hematocrit 07/01/2017 36.8* 38.9 - 50.9 % Final   • MCV 07/01/2017 90.9  80.0 - 99.0 fL Final   • MCH 07/01/2017 30.6  27.0 - 31.0 pg Final   • MCHC 07/01/2017 33.7  32.0 - 36.0 g/dL Final   • RDW 07/01/2017 17.8* 11.3 - 14.5 % Final   • RDW-SD 07/01/2017 57.7* 37.0 - 54.0 fl Final   • MPV 07/01/2017   6.0 - 12.0 fL Final    NC   • Platelets 07/01/2017 75* 150 - 450 10*3/mm3 Final   • Neutrophil % 07/01/2017 74.0* 41.0 - 71.0 % Final   • Lymphocyte % 07/01/2017 8.0* 24.0 - 44.0 % Final   • Monocyte % 07/01/2017 1.0  0.0 - 12.0 % Final   • Eosinophil % 07/01/2017 0.0  0.0 - 3.0 % Final   • Basophil % 07/01/2017 0.0  0.0 - 1.0 % Final   • Bands %  07/01/2017 15.0* 0.0 - 5.0 % Final   • Metamyelocyte % 07/01/2017 2.0* 0.0 - 0.0 % Final   • Neutrophils Absolute 07/01/2017 56.09* 1.50 - 8.30 10*3/mm3 Final   • Lymphocytes Absolute 07/01/2017 5.04* 0.60 - 4.80 10*3/mm3 Final   • Monocytes Absolute 07/01/2017 0.63  0.00 - 1.00 10*3/mm3 Final   • Eosinophils Absolute 07/01/2017 0.00* 0.10 - 0.30 10*3/mm3 Final   • Basophils Absolute 07/01/2017 0.00  0.00 - 0.20 10*3/mm3 Final   • RBC Morphology 07/01/2017 Normal  Normal Final   • WBC Morphology 07/01/2017 Normal  Normal Final   • Platelet Morphology 07/01/2017 Normal  Normal Final   • Glucose 07/01/2017 113  70 - 130 mg/dL Final   • Glucose 07/01/2017 110  70 - 130 mg/dL Final        Ct Abdomen Pelvis Without Contrast    Result Date: 6/30/2017  Narrative: EXAMINATION: CT CHEST WO CONTRAST-, CT SOFT TISSUE NECK WO CONTRAST-, CT ABDOMEN AND PELVIS WO CONTRAST-  INDICATION: Cancer, abdominal pain.  TECHNIQUE: Spiral acquisition unenhanced 5 mm axial images through the neck, chest, abdomen and pelvis.  The radiation dose reduction device was turned on for each scan per the ALARA (As Low as Reasonably Achievable) protocol.  COMPARISON: Most recent  "previous imaging study of the abdomen is 05/03/2013 abdomen and pelvis CT scan. No previous neck or chest CT scans.  FINDINGS: Patient history indicates constipation, abdominal pain and bloating, patient give history suggesting previous paracentesis, \"fluid drained from abdomen\", but no other details are given.  CT SCAN OF THE NECK WITHOUT CONTRAST: Image detail is somewhat limited without IV contrast, however, only shotty cervical lymph nodes are identified. No discrete mass or bulky adenopathy is seen. The largest nodes appear to be under 1 cm, in the right lateral lower neck. No pathologic adenopathy or acute inflammatory change is seen. There is no evidence of cervical airway stenosis. There does appear to be some esophageal reflux, with fluid seen in the mid esophagus. Incidental note is made of moderate multilevel cervical spine degenerative disc disease but no evidence of acute bony trauma or significant focal subluxation. Sagittal images show grossly normal appearance of the cervical airway, including the glottis and epiglottis. No significant hypertrophy is seen of the tonsils or adenoids.      Impression: No significant abnormality is seen of the neck soft tissues for an unenhanced scan.  CT SCAN OF THE CHEST WITHOUT CONTRAST:   TECHNICAL: Exam consists of axial 5 mm unenhanced images through the chest.  FINDINGS: The esophagus is distended with fluid, apparently due to marked distention of the stomach as seen on the abdominal series images. No mediastinal adenopathy, pericardial or pleural effusion is seen. Lung window images show the lungs to appear clear except for mild patchy alveolar disease in the inferior right upper lobe, which could reflect pneumonia, or given history of gastric distention, small focus of aspiration.  IMPRESSION: 1. Fluid distended esophagus apparently due to gastric distention. 2. Patchy alveolar disease of the right upper lobe which may represent mild pneumonia or aspiration.  " ABDOMEN AND PELVIS CT SCAN: As noted previously, stomach is markedly distended with fluid. Small bowel is distended, and there is evidence of at least mild small bowel pneumatosis. There is rather more extensive portal venous gas throughout the left lobe and partly throughout the right lobe. The colon is generally decompressed, and loops of small bowel in the right mid abdomen/lower quadrant, presumably distal ileum are decompressed as well. There is a decompressed terminal ileum and normal appearing cecum and appendix. The transition point itself is difficult to pinpoint, but favored to be in the inferior anterior right abdomen, where a loop of dilated small bowel crosses from left-to-right, and is filled distally with semisolid material, but appears blind ending. Please refer to axial images 111 through 121, and back up to image 1:15, varying from left to right. No inflammatory change or mass is seen here. Note is made of what appears to be the reservoir for a penile prosthesis in the left pelvis. The bladder is normally distended and normal in appearance.  Elsewhere in the upper abdomen, no significant abnormalities are seen of the pancreas, spleen, adrenal glands, or kidneys. Gallbladder is seen in a limited fashion, but appears grossly normal. Venous gas.  IMPRESSION: 1. Findings consistent with distal high-grade small bowel obstruction, with distal small bowel loops containing some pneumatosis, and with fairly extensive portal venous gas present. 2. No evidence of ascites or free air. No marked inflammatory process or marked bowel wall thickening. 3. Incidentally noted penile prosthesis  D:  06/30/2017 E:  06/30/2017      Ct Soft Tissue Neck Without Contrast    Result Date: 6/30/2017  Narrative: EXAMINATION: CT CHEST WO CONTRAST-, CT SOFT TISSUE NECK WO CONTRAST-, CT ABDOMEN AND PELVIS WO CONTRAST-  INDICATION: Cancer, abdominal pain.  TECHNIQUE: Spiral acquisition unenhanced 5 mm axial images through the  "neck, chest, abdomen and pelvis.  The radiation dose reduction device was turned on for each scan per the ALARA (As Low as Reasonably Achievable) protocol.  COMPARISON: Most recent previous imaging study of the abdomen is 05/03/2013 abdomen and pelvis CT scan. No previous neck or chest CT scans.  FINDINGS: Patient history indicates constipation, abdominal pain and bloating, patient give history suggesting previous paracentesis, \"fluid drained from abdomen\", but no other details are given.  CT SCAN OF THE NECK WITHOUT CONTRAST: Image detail is somewhat limited without IV contrast, however, only shotty cervical lymph nodes are identified. No discrete mass or bulky adenopathy is seen. The largest nodes appear to be under 1 cm, in the right lateral lower neck. No pathologic adenopathy or acute inflammatory change is seen. There is no evidence of cervical airway stenosis. There does appear to be some esophageal reflux, with fluid seen in the mid esophagus. Incidental note is made of moderate multilevel cervical spine degenerative disc disease but no evidence of acute bony trauma or significant focal subluxation. Sagittal images show grossly normal appearance of the cervical airway, including the glottis and epiglottis. No significant hypertrophy is seen of the tonsils or adenoids.      Impression: No significant abnormality is seen of the neck soft tissues for an unenhanced scan.  CT SCAN OF THE CHEST WITHOUT CONTRAST:   TECHNICAL: Exam consists of axial 5 mm unenhanced images through the chest.  FINDINGS: The esophagus is distended with fluid, apparently due to marked distention of the stomach as seen on the abdominal series images. No mediastinal adenopathy, pericardial or pleural effusion is seen. Lung window images show the lungs to appear clear except for mild patchy alveolar disease in the inferior right upper lobe, which could reflect pneumonia, or given history of gastric distention, small focus of aspiration.  " IMPRESSION: 1. Fluid distended esophagus apparently due to gastric distention. 2. Patchy alveolar disease of the right upper lobe which may represent mild pneumonia or aspiration.  ABDOMEN AND PELVIS CT SCAN: As noted previously, stomach is markedly distended with fluid. Small bowel is distended, and there is evidence of at least mild small bowel pneumatosis. There is rather more extensive portal venous gas throughout the left lobe and partly throughout the right lobe. The colon is generally decompressed, and loops of small bowel in the right mid abdomen/lower quadrant, presumably distal ileum are decompressed as well. There is a decompressed terminal ileum and normal appearing cecum and appendix. The transition point itself is difficult to pinpoint, but favored to be in the inferior anterior right abdomen, where a loop of dilated small bowel crosses from left-to-right, and is filled distally with semisolid material, but appears blind ending. Please refer to axial images 111 through 121, and back up to image 1:15, varying from left to right. No inflammatory change or mass is seen here. Note is made of what appears to be the reservoir for a penile prosthesis in the left pelvis. The bladder is normally distended and normal in appearance.  Elsewhere in the upper abdomen, no significant abnormalities are seen of the pancreas, spleen, adrenal glands, or kidneys. Gallbladder is seen in a limited fashion, but appears grossly normal. Venous gas.  IMPRESSION: 1. Findings consistent with distal high-grade small bowel obstruction, with distal small bowel loops containing some pneumatosis, and with fairly extensive portal venous gas present. 2. No evidence of ascites or free air. No marked inflammatory process or marked bowel wall thickening. 3. Incidentally noted penile prosthesis  D:  06/30/2017 E:  06/30/2017      Ct Chest Without Contrast    Result Date: 6/30/2017  Narrative: EXAMINATION: CT CHEST WO CONTRAST-, CT SOFT  "TISSUE NECK WO CONTRAST-, CT ABDOMEN AND PELVIS WO CONTRAST-  INDICATION: Cancer, abdominal pain.  TECHNIQUE: Spiral acquisition unenhanced 5 mm axial images through the neck, chest, abdomen and pelvis.  The radiation dose reduction device was turned on for each scan per the ALARA (As Low as Reasonably Achievable) protocol.  COMPARISON: Most recent previous imaging study of the abdomen is 05/03/2013 abdomen and pelvis CT scan. No previous neck or chest CT scans.  FINDINGS: Patient history indicates constipation, abdominal pain and bloating, patient give history suggesting previous paracentesis, \"fluid drained from abdomen\", but no other details are given.  CT SCAN OF THE NECK WITHOUT CONTRAST: Image detail is somewhat limited without IV contrast, however, only shotty cervical lymph nodes are identified. No discrete mass or bulky adenopathy is seen. The largest nodes appear to be under 1 cm, in the right lateral lower neck. No pathologic adenopathy or acute inflammatory change is seen. There is no evidence of cervical airway stenosis. There does appear to be some esophageal reflux, with fluid seen in the mid esophagus. Incidental note is made of moderate multilevel cervical spine degenerative disc disease but no evidence of acute bony trauma or significant focal subluxation. Sagittal images show grossly normal appearance of the cervical airway, including the glottis and epiglottis. No significant hypertrophy is seen of the tonsils or adenoids.      Impression: No significant abnormality is seen of the neck soft tissues for an unenhanced scan.  CT SCAN OF THE CHEST WITHOUT CONTRAST:   TECHNICAL: Exam consists of axial 5 mm unenhanced images through the chest.  FINDINGS: The esophagus is distended with fluid, apparently due to marked distention of the stomach as seen on the abdominal series images. No mediastinal adenopathy, pericardial or pleural effusion is seen. Lung window images show the lungs to appear clear " except for mild patchy alveolar disease in the inferior right upper lobe, which could reflect pneumonia, or given history of gastric distention, small focus of aspiration.  IMPRESSION: 1. Fluid distended esophagus apparently due to gastric distention. 2. Patchy alveolar disease of the right upper lobe which may represent mild pneumonia or aspiration.  ABDOMEN AND PELVIS CT SCAN: As noted previously, stomach is markedly distended with fluid. Small bowel is distended, and there is evidence of at least mild small bowel pneumatosis. There is rather more extensive portal venous gas throughout the left lobe and partly throughout the right lobe. The colon is generally decompressed, and loops of small bowel in the right mid abdomen/lower quadrant, presumably distal ileum are decompressed as well. There is a decompressed terminal ileum and normal appearing cecum and appendix. The transition point itself is difficult to pinpoint, but favored to be in the inferior anterior right abdomen, where a loop of dilated small bowel crosses from left-to-right, and is filled distally with semisolid material, but appears blind ending. Please refer to axial images 111 through 121, and back up to image 1:15, varying from left to right. No inflammatory change or mass is seen here. Note is made of what appears to be the reservoir for a penile prosthesis in the left pelvis. The bladder is normally distended and normal in appearance.  Elsewhere in the upper abdomen, no significant abnormalities are seen of the pancreas, spleen, adrenal glands, or kidneys. Gallbladder is seen in a limited fashion, but appears grossly normal. Venous gas.  IMPRESSION: 1. Findings consistent with distal high-grade small bowel obstruction, with distal small bowel loops containing some pneumatosis, and with fairly extensive portal venous gas present. 2. No evidence of ascites or free air. No marked inflammatory process or marked bowel wall thickening. 3. Incidentally  noted penile prosthesis  D:  06/30/2017 E:  06/30/2017      Xr Chest 1 View    Result Date: 6/30/2017  Narrative: EXAMINATION: XR CHEST 1 VIEW-06/30/2017:  INDICATION: ? Aspiration.  COMPARISON: 06/29/2017 two-view chest.  FINDINGS: The heart, mediastinum and pulmonary vasculature appear within normal limits. Focal nodular density of the right upper-mid lung is unchanged and was evaluated with chest CT scanning earlier today. Chest CT would suggest that this represents a pneumonia. There is no evidence of interval progression of disease or new disease elsewhere.      Impression: 1.  Stable patchy disease of the right upper lung compared to prior study. 2.  No new chest disease is seen.  D:  06/30/2017 E:  06/30/2017  This report was finalized on 6/30/2017 11:46 PM by DR. Jose Cruz Kapoor MD.      Xr Chest 1 View    Result Date: 6/30/2017  Narrative:  EXAMINATION: XR CHEST, SINGLE VIEW - 06/30/2017  INDICATION: deep line placement pacu bay 6; K56.69-Other intestinal obstruction; K55.029-Acute infarction of small intestine, extent unspecified; D72.829-Elevated white blood cell count, unspecified; R10.9-Unspecified abdominal pain; R11.14-Bilious vomiting.  COMPARISON: None.  FINDINGS: 1. Mild nodular patchy densities are seen in the right mid peripheral lung and at both lung bases.  2. There is a right IJ large caliber catheter in place with the tip in the mid SVC in good position, and there is no pneumothorax.  3. Otherwise, no other acute findings are noted when compared to earlier today.         Impression:  The new right IJ deep line is in the SVC, and there is no pneumothorax.  Some minimal patchy nodular densities are seen in the right mid lung deserving of careful follow-up or further evaluation.  There are mild atelectatic opacities at both lung bases.  DICTATED:     06/30/2017 EDITED:         06/30//2017       Xr Abdomen Kub    Result Date: 6/30/2017  Narrative:  EXAMINATION: XR ABDOMEN KUB-  INDICATION: NAUSEA,  VOMITTING; R11.2-Nausea with vomiting, unspecified  COMPARISON: NONE  FINDINGS: Supine radiograph abdomen demonstrates marked distended air-filled small bowel loops. There is no hepatosplenomegaly. There are no pathological abdominal calcifications.         Impression: There is fairly marked gaseous distention of mid and distal small bowel loops, worrisome for mechanical small bowel obstruction.  This report was finalized on 6/30/2017 9:26 AM by Dr. Ottoniel Garcia MD.      Xr Chest Pa And Lateral    Result Date: 6/30/2017  Narrative: EXAMINATION: XR CHEST PA AND LATERAL-  INDICATION: R11.2-Nausea with vomiting, unspecified.  COMPARISON: 12/07/2016.  FINDINGS: There are chronic obstructive pulmonary changes. There is a small area of nodularity in the right upper lobe which represents subtle interval change when compared to previous examination of 12/07/2016. Otherwise, there is no acute inflammatory process, mass or effusion.         Impression: Subtle vague area of nodularity in the right upper lobe, new when compared to 12/07/2016. CT scan of the chest is recommended for further evaluation. IV contrast would not be necessary.  D:  06/30/2017 E:  06/30/2017  This report was finalized on 6/30/2017 9:57 AM by Dr. Ottoniel Garcia MD.        ASSESSMENT  87 years old gentleman with small bowel obstruction    PLAN  1.   Small bowel obstruction: Status post exposure laparotomy with lysis of adhesions done by Dr. Serrano June 30, 2017.  Abdominal pain is better.  2.  Leukocytosis: Most likely reactive.  Patient is currently on IV antibiotics.  3.  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  4.  Acute kidney injury: Most likely prerenal secondary to dehydration.  Continue IV fluid.  Monitor creatinine.      Leny Thakur MD    7/1/2017

## 2017-07-01 NOTE — PLAN OF CARE
Problem: Patient Care Overview (Adult)  Goal: Plan of Care Review  Outcome: Ongoing (interventions implemented as appropriate)    07/01/17 0624   Coping/Psychosocial Response Interventions   Plan Of Care Reviewed With patient;family   Patient Care Overview   Progress improving   Outcome Evaluation   Outcome Summary/Follow up Plan patient slept throughout night. Bowel sounds picked up thorough shift. UOP good. Up to chair at 6am per orders. No complaints of pain. Patient very thirsty. Kept NPO for night. Waiting for am labs to come back. SQ heparin started, zosyn started for high WBC.        Goal: Adult Individualization and Mutuality  Outcome: Ongoing (interventions implemented as appropriate)  Goal: Discharge Needs Assessment  Outcome: Ongoing (interventions implemented as appropriate)    Problem: Perioperative Period (Adult)  Goal: Signs and Symptoms of Listed Potential Problems Will be Absent or Manageable (Perioperative Period)  Outcome: Ongoing (interventions implemented as appropriate)    Problem: Pressure Ulcer Risk (Jhonny Scale) (Adult,Obstetrics,Pediatric)  Goal: Skin Integrity  Outcome: Ongoing (interventions implemented as appropriate)    Problem: Bowel Obstruction (Adult)  Goal: Signs and Symptoms of Listed Potential Problems Will be Absent or Manageable (Bowel Obstruction)  Outcome: Ongoing (interventions implemented as appropriate)

## 2017-07-01 NOTE — CONSULTS
Adult Nutrition  Assessment/PES    Patient Name:  Neil Houser  YOB: 1929  MRN: [id number]  Admit Date:  6/30/2017    Assessment Date:  7/1/2017  Comments:  PN support initiated w/ Pharmacist per protocol.  7.0 %AA, D25w at 20 ml/hr rx'd w/ advancement to goal rate of 80 ml/hr over the next 24 hrs. Monitor closely for refeeding syndrome w/ 5 days of no intake  noted. This will provide 2168 total calories (25 kcal/kg/UBW) urn337 gm Pro (1.5 gm/kg of UBW) will monitr renal fxn closely w/ CKD  .      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; Vir B12 deficiency    Infectious Disease Sepsis    Renal CKD   stage 3    Active Problems:    Polycythemia    Myelofibrosis    SBO (small bowel obstruction)    Acute on chronic renal failure               Nutrition/Diet History       07/01/17 1208    Nutrition/Diet History    Typical Food/Fluid Intake pt reports he was not able to eat for the past 5 days; lost a few pounds            Anthropometrics       07/01/17 1209    Anthropometrics    RD Documented Weight on Admission 81.2 kg (179 lb)   6/29/17    Usual Body Weight (UBW)    Usual Body Weight 83.9 kg (185 lb)   per pt report; historic wt office wt 191 lb (4/14/2107)    Weight Loss 2.722 kg (6 lb)    % Weight Loss  3 %    Weight Loss Time Frame 5 days    Body Mass Index (BMI)    BMI Grade 19.1 - 24.9 - normal            Labs/Tests/Procedures/Meds       07/01/17 1211    Labs/Tests/Procedures/Meds    Labs/Tests Review Reviewed;BUN;Creat;Mg++;Platlets     Results from last 7  days  Lab Units 07/01/17  0348   SODIUM mmol/L 140   POTASSIUM mmol/L 3.8   CHLORIDE mmol/L 101   CO2 mmol/L 31.0   BUN mg/dL 58*   CREATININE mg/dL 2.80*   CALCIUM mg/dL 9.0   BILIRUBIN mg/dL 0.7   ALK PHOS U/L 36   ALT (SGPT) U/L 11   AST (SGOT) U/L 26   GLUCOSE mg/dL 114*                Physical Findings       07/01/17 1212    Physical Findings/Assessment    Additional Documentation Physical Appearance (Group)    Physical Appearance    Tubes nasogastric tube   to LWS    Skin surgical wound            Estimated/Assessed Needs       07/01/17 1212    Calculation Measurements    Weight Used For Calculations 83.9 kg (185 lb)    Height Used for Calculations 1.829 m (6')    Estimated/Assessed Energy Needs    Energy Need Method Kcal/kg;[employer]or;Meza Marcus    kcal/kg 25    25 Kcal/Kg (kcal) 2097.88    Age 87    RMR ([employer] Equation) 1552.15    Activity Factors ([employer] Jeor)  Out of bed, ambulatory  1.3    Total estimated needs ([employer]. Jeor) 2018    Estimated REE  (Meza Marcus) 1547    Activity Factors 1.3    Estimated Kcal (Meza Marcus)  2011    Estimated Kcal Range  2100 kcal/d post-op    Estimated/Assessed Protein Needs    Weight Used for Protein Calculation 83.9 kg (184 lb 15.5 oz)    Protein (gm/kg) 1.5    1.5 Gm Protein (gm) 125.85    Estimated Protein Range 126 gm/d  post-op ; CKD stage 3 noted    Estimated/Assessed Fluid Needs    Fluid Need Method RDA method    RDA Method (mL)  2100            Nutrition Prescription Ordered       07/01/17 1217    Nutrition Prescription PO    Current PO Diet NPO                Problem/Interventions:        Problem 1       07/01/17 1217    Nutrition Diagnoses Problem 1    Problem 1 Needs Alternate Route    Etiology (related to) Medical Diagnosis    Gastrointestinal Bowel obstruction;Ischemic bowel   s/p exp lap w/ TUAN  (6/30)    Signs/Symptoms (evidenced by) NPO;Other (comment)   NGT to LWS                    Intervention Goal       07/01/17  1218    Intervention Goal    General Nutrition support treatment    TF/PN Inititiate TF/PN;Adjust TF/PN            Nutrition Intervention       07/01/17 1219    Nutrition Intervention    RD/Tech Action Care plan reviewd;Follow Tx progress;Recommend/ordered    Recommended/Ordered PN            Nutrition Prescription       07/01/17 1219    Nutrition Prescription PN    Parenteral Prescription PN begin/change;Parenteral to supply    PN Route Peripheral    Dextrose Concentration (%) 25 %    Amino Acid Concentration (%) 7.0%    PN Goal Rate (mL/hr) 80 mL/hr    PN Starting Rate (mL/hr) 20 mL/hr    PN Goal Volume (mL) 1.92 mL    PN Starting Volume (mL) 480 mL    New PN Prescription Ordered? Yes    PN to Supply    NPC/Day 1632 NPC/day    Kcal/Day 2168 Kcal/day    Kcal/Kg 25 Kcal/kg    Protein (gm/day) 134 gm/day    Meet Estimated Kcal Need (%) 103 %    Meet Estimated Protein Need (%) 106 %    Other Orders    Labs NUT Panel;PreAlb;CRP    Labs Ordered? Yes              Electronically signed by:  Michelle Lim RD  07/01/17 12:27 PM

## 2017-07-01 NOTE — PROGRESS NOTES
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 CT scan revealed a high grade SBO w/ portal venous gas. Patient went to the operating room with Dr. Serrano where she found \"dilated proximal small bowel showing signs of early ischemia, one adhesial band RLQ, bowel viable after band released and untwisted, proximal small bowel filled with stool, no small bowel masses identified, colon normal throughout\". Patient was hemodynamically stable postoperatively after arrival in the intensive care unit on minimal O2 w/ NGT to LWS.     Interval History:   Patient is doing well today.  He is asking for something to eat.  He is resting comfortably.  No nausea or vomiting.  No current abdominal pain.  Good urine output.  He apparently drinks 4 glasses of wine each evening.  No fevers or chills.     History taken from:   Patient/staff/chart    PMH/FH/Social History were reviewed and updated appropriately in the electronic medical record.     Review of Systems:    Review of 14 systems was completed with positives and pertinent negatives noted in the subjective section.  All other systems reviewed and are negative.         Objective     Vital Signs  Temp:  [97.5 °F (36.4 °C)-98.6 °F (37 °C)] 98.6 °F (37 °C)  Heart Rate:  [] 86  Resp:  [16-22] 20  BP: ()/(28-77) 114/58  06/30 0701 - 07/01 0700  In: 5593.4 [I.V.:3993.4]  Out: 2875 [Urine:825]  Body mass index is 25.09 kg/(m^2).     Physical Exam:     Constitutional:    Drowsy but arousable, cooperative, in no acute distress   Head:    Normocephalic, without obvious abnormality, atraumatic   Eyes:            Lids and lashes normal, " conjunctivae and sclerae normal, no   icterus, no pallor, corneas clear, PER   ENMT:   Ears appear intact with no abnormalities noted      No oral lesions, no thrush, oral mucosa moist   Neck:   No adenopathy, supple, trachea midline, no thyromegaly, no JVD       Lungs/Resp:     Normal effort, symmetric chest rise, no crepitus, clear to      auscultation bilaterally, no chest wall tenderness                Heart/CV:    Regular rhythm and normal rate, normal S1 and S2, no            murmur   Abdomen/GI:     Diminished bowel sounds, no masses, no organomegaly, soft,        Midline dressing intact with only small amount of blood, non-tender, non-distended   :     Deferred   Extremities/MSK:   No clubbing or cyanosis.  No edema.  Normal tone.  No deformities.    Pulses:   Pulses palpable and equal bilaterally   Skin:   No bleeding, bruising or rash   Heme/Lymph:   No cervical or supraclavicular adenopathy.    Neurologic:    Psychiatric:       Moves all extremities with no obvious focal motor deficit.  Cranial nerves 2 - 12 grossly intact   Oriented x 3, normal affect.             Results Review:     I reviewed the patient's new clinical results.     Results from last 7 days  Lab Units 07/01/17  0348 06/30/17  0936   SODIUM mmol/L 140 136   POTASSIUM mmol/L 3.8 3.7   CHLORIDE mmol/L 101 89*   CO2 mmol/L 31.0 32.0*   BUN mg/dL 58* 50*   CREATININE mg/dL 2.80* 3.00*   CALCIUM mg/dL 9.0 10.9*   BILIRUBIN mg/dL 0.7 0.7   ALK PHOS U/L 36 61   ALT (SGPT) U/L 11 25   AST (SGOT) U/L 26 34*   GLUCOSE mg/dL 114* 174*       Results from last 7 days  Lab Units 07/01/17  0348 06/30/17  0936   WBC 10*3/mm3 63.02* 59.71*   HEMOGLOBIN g/dL 12.4* 16.4   HEMATOCRIT % 36.8* 47.0   PLATELETS 10*3/mm3 75* 142*   MONOCYTES % % 1.0 6.0           Results from last 7 days  Lab Units 07/01/17  0348   MAGNESIUM mg/dL 1.7   PHOSPHORUS mg/dL 3.8       I reviewed the patient's new imaging including images and reports.    Medication Review:      heparin (porcine) 5,000 Units Subcutaneous Q8H   insulin lispro 0-7 Units Subcutaneous 4x Daily AC & at Bedtime   pantoprazole 40 mg Intravenous Q AM   piperacillin-tazobactam 3.375 g Intravenous Q8H       lactated ringers 150 mL/hr Last Rate: 150 mL/hr (07/01/17 0958)   Pharmacy Consult     Pharmacy to Dose TPN         Assessment/Plan     Active Problems:    Polycythemia    Myelofibrosis    SBO (small bowel obstruction)    Acute on chronic renal failure    87-year-old with bowel obstruction with evidence of early ischemia on laparotomy with lysis of adhesion and not requiring resection.  Currently stable in intensive care unit.  Urine output improving.  Patient has significant leukocytosis.    Plan:  1.  Continue antibiotics.  2.  Monitor in the intensive care unit.  3.  Bowel rest.  4.  Initiating TPN, adjust IV fluids.  5.  Watch renal function/electrolytes.  6.  Add thiamine daily.  7.  IV Valium if needed for agitation/withdrawal.      Abdiaziz Maya MD  07/01/17  10:25 AM        *. Please note that portions of this note were completed with a voice recognition program. Efforts were made to edit the dictations, but occasionally words are mistranscribed.

## 2017-07-01 NOTE — PROGRESS NOTES
" LOS: 1 day   Patient Care Team:  Andrea Murguia MD as PCP - General  Andrea Murguia MD as PCP - Family Medicine      Interval History: POD 1 s/p exp lap, lysis of adhesions    Subjective: Pt feels much better.  No abd pain.  He has been up to chair already.        Objective     Vital Signs  Blood pressure 119/63, pulse 89, temperature 98.6 °F (37 °C), temperature source Axillary, resp. rate 20, height 72\" (182.9 cm), weight 185 lb (83.9 kg), SpO2 94 %.     NG tube with approx 300 since surgery    Physical Exam:   Alert, abd soft, appropriate tenderness, hypoactive bs     Results Review:     I reviewed the patient's new clinical results.    Medication Review:  The patient's medications were reviewed and up to date      Assessment/Plan    1. SBO with no full thickness ischemia and no resection needed.  This was a high grade obstruction that had probably been going on for a while and thus may take a while for bowel function to return.  I recommended starting TPN as he has had no nutrition since Monday.  Continue NG for now.  2. Leukocytosis should improve with abx and time.  3. Hypotension/tachycardia/sepsis resolved.    Active Problems:    Polycythemia    Myelofibrosis    SBO (small bowel obstruction)    Acute on chronic renal failure        Plan for disposition:Where: home    Elena Serrano MD  07/01/17  10:47 AM      "

## 2017-07-01 NOTE — PLAN OF CARE
Problem: Patient Care Overview (Adult)  Goal: Plan of Care Review  Outcome: Ongoing (interventions implemented as appropriate)  Goal: Adult Individualization and Mutuality  Outcome: Ongoing (interventions implemented as appropriate)  Goal: Discharge Needs Assessment  Outcome: Ongoing (interventions implemented as appropriate)    Problem: Perioperative Period (Adult)  Goal: Signs and Symptoms of Listed Potential Problems Will be Absent or Manageable (Perioperative Period)  Outcome: Ongoing (interventions implemented as appropriate)    Problem: Pressure Ulcer Risk (Jhonny Scale) (Adult,Obstetrics,Pediatric)  Goal: Skin Integrity  Outcome: Ongoing (interventions implemented as appropriate)    Problem: Bowel Obstruction (Adult)  Goal: Signs and Symptoms of Listed Potential Problems Will be Absent or Manageable (Bowel Obstruction)  Outcome: Ongoing (interventions implemented as appropriate)

## 2017-07-02 NOTE — PLAN OF CARE
Problem: Patient Care Overview (Adult)  Goal: Plan of Care Review  Outcome: Ongoing (interventions implemented as appropriate)    07/02/17 0355 07/02/17 1600 07/02/17 1746   Coping/Psychosocial Response Interventions   Plan Of Care Reviewed With --  patient --    Patient Care Overview   Progress improving --  --    Outcome Evaluation   Outcome Summary/Follow up Plan --  --  Pt stable at this time. Vital signs stable all day, ambulating around unit, and up to chair x2. TPN to gaol and LR to 70ml/hr. Pt having hypoactive BS , intermittent in upper quads, hiccups present all day. Pt given 1liter ns today for renal function, e-lytes replaced. Will anticipate the removal of soriano catheter in am, and transfer to floor.        Goal: Adult Individualization and Mutuality  Outcome: Ongoing (interventions implemented as appropriate)  Goal: Discharge Needs Assessment  Outcome: Ongoing (interventions implemented as appropriate)    Problem: Perioperative Period (Adult)  Goal: Signs and Symptoms of Listed Potential Problems Will be Absent or Manageable (Perioperative Period)  Outcome: Ongoing (interventions implemented as appropriate)    Problem: Pressure Ulcer Risk (Jhonny Scale) (Adult,Obstetrics,Pediatric)  Goal: Skin Integrity  Outcome: Ongoing (interventions implemented as appropriate)    Problem: Bowel Obstruction (Adult)  Goal: Signs and Symptoms of Listed Potential Problems Will be Absent or Manageable (Bowel Obstruction)  Outcome: Ongoing (interventions implemented as appropriate)

## 2017-07-02 NOTE — PROGRESS NOTES
Pharmacy Parenteral Nutrition Evaluation    Neil Houser is a  87 y.o. male receiving TPN.     Indication: SBO  Consulting Physician: Zach    Total Fluid Rate (if stated): 150 cc/hr per Dr. Maya on 7/1/17 7/2: TPN advanced to goal rate. Increased K, Mg, and Phos today - PRUDENCIO improving.    Labs  Results from last 7 days   Lab Units 07/02/17  0329 07/01/17  1353 07/01/17  0348   SODIUM mmol/L 137  137 138 140   POTASSIUM mmol/L 3.7  3.7 3.9 3.8   CHLORIDE mmol/L 103  103 100 101   CO2 mmol/L 28.0  28.0 30.0 31.0   BUN mg/dL 42*  42* 52* 58*   CREATININE mg/dL 1.90*  1.90* 2.40* 2.80*   CALCIUM mg/dL 9.0  9.0 9.1 9.0   BILIRUBIN mg/dL 0.7  0.7 0.8 0.7   ALK PHOS U/L 32  32 36 36   ALT (SGPT) U/L 20  20 20 11   AST (SGOT) U/L 29  29 32 26   GLUCOSE mg/dL 117*  117* 104* 114*       Results from last 7 days   Lab Units 07/02/17  0329 07/01/17  1353 07/01/17  0348   MAGNESIUM mg/dL 1.7  1.7 1.7 1.7   PHOSPHORUS mg/dL 2.3*  2.3* 3.5 3.8   PREALBUMIN mg/dL --  15.7 16.0       Results from last 7 days   Lab Units 07/02/17  0932 07/01/17  0348 06/30/17  0936   WBC 10*3/mm3 29.62* 60.03*  63.02* 59.71*   HEMOGLOBIN g/dL 12.3* 12.9*  12.4* 16.4   HEMATOCRIT % 36.4* 39.5  36.8* 47.0   PLATELETS 10*3/mm3 46* 84*  75* 142*       estimated creatinine clearance is 30.1 mL/min (by C-G formula based on Cr of 1.9).    I/O last 3 completed shifts:  In: 4947.4 [I.V.:4463.4; IV Piggyback:300]  Out: 3625 [Urine:2825; Other:800]      Dietician Recommendations  Diet Order   Procedures   • NPO Diet       Current TPN Regimen Recommendation per dietary :  Dextrose 25% / Amino Acid 7% at goal rate of 80 mL/hr. No lipids for now. TPN currently running at 60 ml/hr, advance to goal rate today.    Assessment/Plan:  PRUDENCIO resolving, Scr improved today (1.9). Pt has CKD per H&P. Per notes, baseline Scr is 1.5.   TPN to advance to goal rate with the following:  Na: 45 mEq/L  K: 25 mEq/L  Calcium: 5 mEq/L  Magnesium: 8  mEq/L  Phosphorus: 10 mmoL/L    TPN will include trace elements and MVI. Will watch electrolytes closely and adjust TPN as necessary.   Renal function improved today, added Phos replacement outside the TPN because phos was low. Mag was replaced this am.   Increased K, Mag, and Phos in the TPN.    Pharmacy will continue to follow closely, and make adjustments as needed. Thank you for this consult.    Marzena Lawson, PharmD  7/2/2017  11:48 AM

## 2017-07-02 NOTE — PROGRESS NOTES
" LOS: 2 days   Patient Care Team:  Andrea Murguia MD as PCP - General  Andrea Murguia MD as PCP - Family Medicine      Subjective: No complaint of pain.No flatus.      Objective     Vital Signs  Blood pressure 108/50, pulse 71, temperature 98.5 °F (36.9 °C), temperature source Axillary, resp. rate 20, height 72\" (182.9 cm), weight 185 lb (83.9 kg), SpO2 97 %.    since midnight, lightly bile tenged    Physical Exam:   Soft, distention improved, +BS, inc c/d/i  Results Review:     I reviewed the patient's new clinical results.    Medication Review:  The patient's medications were reviewed and up to date      Assessment/Plan    1. SBO s/p lysis of adhesions, slow return of bowel function expected.  Continue NG although output significantly diminished.  Enc ambulation.  2.  Good UOP: d/c soriano.  3.  Leukocytosis: CBC pending this am. Continue antibiotics for now.  4. Nutrition: continue TPN.    Active Problems:    Polycythemia    Myelofibrosis    SBO (small bowel obstruction)    Acute on chronic renal failure        Plan for disposition:Where: home    Elena Serrano MD  07/02/17  9:10 AM      "

## 2017-07-02 NOTE — PLAN OF CARE
Problem: Patient Care Overview (Adult)  Goal: Plan of Care Review  Outcome: Ongoing (interventions implemented as appropriate)    07/02/17 0355   Coping/Psychosocial Response Interventions   Plan Of Care Reviewed With patient   Patient Care Overview   Progress improving   Outcome Evaluation   Outcome Summary/Follow up Plan NG to low wall suction, TPN continued and increased per order. LR continued and titrated per order. Pt has hypoactive bowel sounds, no nausea or abdominal pain.        Goal: Discharge Needs Assessment  Outcome: Ongoing (interventions implemented as appropriate)    07/02/17 0355   Discharge Needs Assessment   Concerns To Be Addressed no discharge needs identified   Readmission Within The Last 30 Days no previous admission in last 30 days   Discharge Disposition still a patient   Current Health   Anticipated Changes Related to Illness none   Self-Care   Equipment Currently Used at Home none   Living Environment   Transportation Available car;family or friend will provide         Problem: Perioperative Period (Adult)  Goal: Signs and Symptoms of Listed Potential Problems Will be Absent or Manageable (Perioperative Period)  Outcome: Ongoing (interventions implemented as appropriate)    07/02/17 0355   Perioperative Period   Problems Assessed (Perioperative Period) all   Problems Present (Perioperative Period) pain         Problem: Pressure Ulcer Risk (Jhonny Scale) (Adult,Obstetrics,Pediatric)  Goal: Skin Integrity  Outcome: Ongoing (interventions implemented as appropriate)    07/02/17 0355   Pressure Ulcer Risk (Jhonny Scale) (Adult,Obstetrics,Pediatric)   Skin Integrity making progress toward outcome         Problem: Bowel Obstruction (Adult)  Goal: Signs and Symptoms of Listed Potential Problems Will be Absent or Manageable (Bowel Obstruction)  Outcome: Ongoing (interventions implemented as appropriate)    07/02/17 0355   Bowel Obstruction   Problems Assessed (Bowel Obstruction) all   Problems  Present (Bowel Obstruction) pain

## 2017-07-03 NOTE — PROGRESS NOTES
" LOS: 3 days   Patient Care Team:  Andrea Murguia MD as PCP - General  Andrea Murguia MD as PCP - Family Medicine        Subjective: seems somewhat confused this am and demanding to be discharged.  He thinks he has been in hospital since Tuesday of last week.  No complaint of abd pain.      Objective     Vital Signs  Blood pressure 131/100, pulse 75, temperature 98.3 °F (36.8 °C), temperature source Oral, resp. rate 20, height 72\" (182.9 cm), weight 185 lb (83.9 kg), SpO2 95 %.    Physical Exam:   Soft, hypoactive BS, less distended, no tenderness, inc C/D/I     Results Review:     WBC now 15, Creatinine 1.4        Assessment/Plan    SBO s/p lysis: slow return of bowel function expected, NG output much diminished and clear; place to gravity and if minimal residual d/c NG later today.  Ambulate.  Good UOP: d/c christie Guillen to see tomorrow    Active Problems:    Polycythemia    Myelofibrosis    SBO (small bowel obstruction)    Acute on chronic renal failure        Plan for disposition:Where: home    Elena Serrano MD  07/03/17  8:21 AM      "

## 2017-07-03 NOTE — PLAN OF CARE
Problem: Patient Care Overview (Adult)  Goal: Plan of Care Review  Outcome: Ongoing (interventions implemented as appropriate)    07/02/17 0355 07/03/17 1000 07/03/17 1540   Coping/Psychosocial Response Interventions   Plan Of Care Reviewed With --  patient --    Patient Care Overview   Progress improving --  --    Outcome Evaluation   Outcome Summary/Follow up Plan --  --  f/c d/c'd, PICC ordered but will most likly be placed tomorrow so right IJ can be d/c'd. NG 250mls out, clamped and watching if pt tolerating. Up to chair for a couple hours. TPN infusing, IVF d/c'd.        Goal: Adult Individualization and Mutuality  Outcome: Ongoing (interventions implemented as appropriate)  Goal: Discharge Needs Assessment  Outcome: Ongoing (interventions implemented as appropriate)    Problem: Perioperative Period (Adult)  Goal: Signs and Symptoms of Listed Potential Problems Will be Absent or Manageable (Perioperative Period)  Outcome: Ongoing (interventions implemented as appropriate)    Problem: Pressure Ulcer Risk (Jhonny Scale) (Adult,Obstetrics,Pediatric)  Goal: Skin Integrity  Outcome: Ongoing (interventions implemented as appropriate)    Problem: Bowel Obstruction (Adult)  Goal: Signs and Symptoms of Listed Potential Problems Will be Absent or Manageable (Bowel Obstruction)  Outcome: Ongoing (interventions implemented as appropriate)

## 2017-07-03 NOTE — PROGRESS NOTES
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 CT scan revealed a high grade SBO w/ portal venous gas. Patient went to the operating room with Dr. Serrano where she found \"dilated proximal small bowel showing signs of early ischemia, one adhesial band RLQ, bowel viable after band released and untwisted, proximal small bowel filled with stool, no small bowel masses identified, colon normal throughout\". Patient was hemodynamically stable postoperatively after arrival in the intensive care unit on minimal O2 w/ NGT to LWS.     Interval History:   Sitting in chair, doing well, soriano dc'd, cr back to baseline, NGT clamped, on TPN    History taken from:   Patient/staff/chart    PMH/FH/Social History were reviewed and updated appropriately in the electronic medical record.     Review of Systems:    Review of 14 systems was completed with positives and pertinent negatives noted in the subjective section.  All other systems reviewed and are negative.         Objective     Vital Signs  Temp:  [97.8 °F (36.6 °C)-98.7 °F (37.1 °C)] 97.8 °F (36.6 °C)  Heart Rate:  [63-85] 81  Resp:  [16-24] 16  BP: (105-147)/() 131/78  07/02 0701 - 07/03 0700  In: 4800 [I.V.:2397]  Out: 3495 [Urine:3195]  Body mass index is 25.09 kg/(m^2).     Physical Exam:     Constitutional:    Awake and alert, cooperative, in no acute distress   Head:    Normocephalic, without obvious abnormality, atraumatic   Eyes:            Lids and lashes normal, conjunctivae and sclerae normal, no   icterus, no pallor, corneas clear, PER   ENMT:   Ears appear intact with no abnormalities noted      No oral lesions, no " thrush, oral mucosa moist   Neck:   No adenopathy, supple, trachea midline, no thyromegaly, no JVD, right IJ CVC in place.        Lungs/Resp:     Normal effort, symmetric chest rise, no crepitus, clear to      auscultation bilaterally, no chest wall tenderness                Heart/CV:    Regular rhythm and normal rate, normal S1 and S2, no            murmur   Abdomen/GI:     Diminished bowel sounds, no masses, no organomegaly, soft,        midline dressing intact, non-tender, non-distended   :     Deferred   Extremities/MSK:   No clubbing or cyanosis.  No edema.  Normal tone.  No deformities.    Pulses:   Pulses palpable and equal bilaterally   Skin:   No bleeding, bruising or rash   Heme/Lymph:   No cervical or supraclavicular adenopathy.    Neurologic:    Psychiatric:       Moves all extremities with no obvious focal motor deficit.  Cranial nerves 2 - 12 grossly intact   Oriented x 3, normal affect.             Results Review:     I reviewed the patient's new clinical results.     Results from last 7 days  Lab Units 07/03/17  0408 07/02/17  0329 07/01/17  1353   SODIUM mmol/L 139  139 137  137 138   POTASSIUM mmol/L 4.1  4.1 3.7  3.7 3.9   CHLORIDE mmol/L 105  105 103  103 100   CO2 mmol/L 27.0  27.0 28.0  28.0 30.0   BUN mg/dL 36*  36* 42*  42* 52*   CREATININE mg/dL 1.40*  1.40* 1.90*  1.90* 2.40*   CALCIUM mg/dL 9.8  9.8 9.0  9.0 9.1   BILIRUBIN mg/dL 0.7  0.7 0.7  0.7 0.8   ALK PHOS U/L 32  32 32  32 36   ALT (SGPT) U/L 22  22 20  20 20   AST (SGOT) U/L 22  22 29  29 32   GLUCOSE mg/dL 120*  120* 117*  117* 104*       Results from last 7 days  Lab Units 07/03/17  0408 07/02/17  0932 07/01/17  0348 06/30/17  0936   WBC 10*3/mm3 15.68* 29.62* 60.03*  63.02* 59.71*   HEMOGLOBIN g/dL 11.8* 12.3* 12.9*  12.4* 16.4   HEMATOCRIT % 34.8* 36.4* 39.5  36.8* 47.0   PLATELETS 10*3/mm3 50* 46* 84*  75* 142*   MONOCYTES % %  --   --  1.0 6.0           Results from last 7 days  Lab Units  07/03/17  0408 07/02/17  0329 07/01/17  1353   MAGNESIUM mg/dL 1.6  1.6 1.7  1.7 1.7   PHOSPHORUS mg/dL 2.4  2.4 2.3*  2.3* 3.5       I reviewed the patient's new imaging including images and reports.    Medication Review:     heparin (porcine) 5,000 Units Subcutaneous Q8H   insulin regular 0-7 Units Subcutaneous Q6H   pantoprazole 40 mg Intravenous Q AM   piperacillin-tazobactam 3.375 g Intravenous Q6H       Adult Central 2-in-1 TPN  Last Rate: 80 mL/hr at 07/02/17 1707   Adult Central 2-in-1 TPN     Pharmacy Consult     Pharmacy to Dose TPN         Assessment/Plan     Active Problems:    Polycythemia    Myelofibrosis    SBO (small bowel obstruction)    Acute on chronic renal failure    87-year-old with bowel obstruction with evidence of early ischemia on laparotomy with lysis of adhesion and not requiring resection.  Currently stable in intensive care unit.  Urine output improving.  Patient has significant leukocytosis which is improving.    Plan:  -TPN / Diet per surgery  -d/c right IJ, d/c soriano  -PICC  -Cr back to baseline, d/c additional IVF  -continue abx for now  -monitor thrombocytopenia  -SCDs/TEDs/Heparin/PPI  -probably to tele tomorrow    Star Razo MD  07/03/17  1:26 PM

## 2017-07-03 NOTE — PROGRESS NOTES
DATE OF VISIT: 7/3/2017    Chief Complain: Followup for Myelofibrosis     SUBJECTIVE: The patient has been doing fairly well.  He  denied any fever or  chills, no night sweats, denied any headaches. His abdominal pain is better. He is complaining of hiccups.     REVIEW OF SYSTEMS: All the other 9 systems are reviewed by me and negative  except what is mentioned in HPI.     PAST MEDICAL HISTORY/SOCIAL HISTORY/FAMILY HISTORY: Unchanged from my prior documentation done on 07/01/2017.      Current Facility-Administered Medications:   •  Adult Central 2-in-1 TPN, , Intravenous, Continuous, Abdiaziz Maya MD, Last Rate: 80 mL/hr at 07/02/17 1707  •  dextrose (D50W) solution 25 g, 25 g, Intravenous, Q15 Min PRN, Star Razo MD  •  dextrose (GLUTOSE) oral gel 15 g, 15 g, Oral, Q15 Min PRN, tSar Razo MD  •  diazePAM (VALIUM) injection 2.5 mg, 2.5 mg, Intravenous, Q4H PRN, Abdiaziz Maya MD, 2.5 mg at 07/02/17 1707  •  fentaNYL citrate (PF) (SUBLIMAZE) injection 50 mcg, 50 mcg, Intravenous, Q2H PRN, Cyrus Oliva, APRN, 50 mcg at 07/01/17 2345  •  glucagon (GLUCAGEN) injection 1 mg, 1 mg, Subcutaneous, Q15 Min PRN, Star Razo MD  •  heparin (porcine) 5000 UNIT/ML injection 5,000 Units, 5,000 Units, Subcutaneous, Q8H, Star Razo MD, 5,000 Units at 07/03/17 0537  •  insulin regular (humuLIN R,novoLIN R) injection 0-7 Units, 0-7 Units, Subcutaneous, Q6H, Abdiaziz Maya MD  •  lactated ringers infusion, 70 mL/hr, Intravenous, Continuous, Abdiaziz Maya MD, Last Rate: 70 mL/hr at 07/03/17 0431, 70 mL/hr at 07/03/17 0431  •  magnesium sulfate 4 gram infusion- Mg less than or equal to 1 mg/dL, 4 g, Intravenous, PRN **OR** magnesium sulfate 3 gram infusion (1gm x 3) - Mg 1.1 - 1.5 mg/dL, 1 g, Intravenous, PRN **OR** Magnesium Sulfate 2 gram infusion- Mg 1.6 - 1.9 mg/dL, 2 g, Intravenous, PRN, Abdiaziz Maya MD, Last Rate: 25 mL/hr at 07/03/17 0645,  "2 g at 07/03/17 0645  •  Morphine injection 4 mg, 4 mg, Intravenous, Q2H PRN **AND** naloxone (NARCAN) injection 0.4 mg, 0.4 mg, Intravenous, Q5 Min PRN, Elena Serrano MD  •  ondansetron (ZOFRAN) tablet 4 mg, 4 mg, Oral, Q6H PRN **OR** ondansetron (ZOFRAN) injection 4 mg, 4 mg, Intravenous, Q6H PRN, Elena Serrano MD  •  pantoprazole (PROTONIX) injection 40 mg, 40 mg, Intravenous, Q AM, Star Razo MD, 40 mg at 07/03/17 0537  •  Pharmacy Consult, , Does not apply, Continuous PRN, Star Razo MD  •  Pharmacy to Dose TPN, , Does not apply, Continuous PRN, Elena Serrano MD  •  piperacillin-tazobactam (ZOSYN) 3.375 g/100 mL 0.9% NS IVPB (mbp), 3.375 g, Intravenous, Q6H, Elena Serrano MD, 3.375 g at 07/03/17 0821  •  potassium phosphate 45 mmol in sodium chloride 0.9 % 250 mL infusion, 45 mmol, Intravenous, PRN **OR** potassium phosphate 30 mmol in sodium chloride 0.9 % 100 mL infusion, 30 mmol, Intravenous, PRN **OR** potassium phosphate 15 mmol in sodium chloride 0.9 % 100 mL infusion, 15 mmol, Intravenous, PRN, Last Rate: 50 mL/hr at 07/02/17 1336, 15 mmol at 07/02/17 1336 **OR** sodium phosphates 45 mmol in sodium chloride 0.9 % 250 mL IVPB, 45 mmol, Intravenous, PRN **OR** sodium phosphates 30 mmol in sodium chloride 0.9 % 100 mL IVPB, 30 mmol, Intravenous, PRN **OR** sodium phosphates 15 mmol in sodium chloride 0.9 % 100 mL IVPB, 15 mmol, Intravenous, PRN, Abdiaziz Maya MD  •  sodium chloride 0.9 % flush 10 mL, 10 mL, Intravenous, PRN, PEPPER Ch  •  thiamine (B-1) 100 mg in sodium chloride 0.9 % 100 mL IVPB, 100 mg, Intravenous, Daily, Abdiaziz Maya MD, Last Rate: 200 mL/hr at 07/03/17 0821, 100 mg at 07/03/17 0821    PHYSICAL EXAMINATION:   /76  Pulse 73  Temp 98.3 °F (36.8 °C) (Oral)   Resp 20  Ht 72\" (182.9 cm)  Wt 185 lb (83.9 kg)  SpO2 97%  BMI 25.09 kg/m2  GENERAL: Age appropriate. No acute distress.   HEENT: Head atraumatic, normocephalic. " "  NECK: Supple. No JVD. No lymphadenopathy.   LUNGS: Clear to auscultation bilaterally. No wheezing. No rhonchi.   HEART: Regular rate and rhythm. S1, S2, no murmurs.   ABDOMEN: Soft, nontender, nondistended. Bowel sounds positive. No  hepatosplenomegaly.   EXTREMITIES: No clubbing, cyanosis, or edema.   SKIN: No rashes. No purpura.   NEUROLOGIC: Awake and oriented x3. Strength 5 out of 5 in all muscle groups.     Admission on 06/30/2017   No results displayed because visit has over 200 results.          Ct Abdomen Pelvis Without Contrast    Result Date: 7/2/2017  Narrative: EXAMINATION: CT CHEST WO CONTRAST-, CT SOFT TISSUE NECK WO CONTRAST-, CT ABDOMEN AND PELVIS WO CONTRAST-  INDICATION: Cancer, abdominal pain.  TECHNIQUE: Spiral acquisition unenhanced 5 mm axial images through the neck, chest, abdomen and pelvis.  The radiation dose reduction device was turned on for each scan per the ALARA (As Low as Reasonably Achievable) protocol.  COMPARISON: Most recent previous imaging study of the abdomen is 05/03/2013 abdomen and pelvis CT scan. No previous neck or chest CT scans.  FINDINGS: Patient history indicates constipation, abdominal pain and bloating, patient give history suggesting previous paracentesis, \"fluid drained from abdomen\", but no other details are given.  CT SCAN OF THE NECK WITHOUT CONTRAST: Image detail is somewhat limited without IV contrast, however, only shotty cervical lymph nodes are identified. No discrete mass or bulky adenopathy is seen. The largest nodes appear to be under 1 cm, in the right lateral lower neck. No pathologic adenopathy or acute inflammatory change is seen. There is no evidence of cervical airway stenosis. There does appear to be some esophageal reflux, with fluid seen in the mid esophagus. Incidental note is made of moderate multilevel cervical spine degenerative disc disease but no evidence of acute bony trauma or significant focal subluxation. Sagittal images show grossly " normal appearance of the cervical airway, including the glottis and epiglottis. No significant hypertrophy is seen of the tonsils or adenoids.      Impression: No significant abnormality is seen of the neck soft tissues for an unenhanced scan.  CT SCAN OF THE CHEST WITHOUT CONTRAST:   TECHNICAL: Exam consists of axial 5 mm unenhanced images through the chest.  FINDINGS: The esophagus is distended with fluid, apparently due to marked distention of the stomach as seen on the abdominal series images. No mediastinal adenopathy, pericardial or pleural effusion is seen. Lung window images show the lungs to appear clear except for mild patchy alveolar disease in the inferior right upper lobe, which could reflect pneumonia, or given history of gastric distention, small focus of aspiration.  IMPRESSION: 1. Fluid distended esophagus apparently due to gastric distention. 2. Patchy alveolar disease of the right upper lobe which may represent mild pneumonia or aspiration.  ABDOMEN AND PELVIS CT SCAN: As noted previously, stomach is markedly distended with fluid. Small bowel is distended, and there is evidence of at least mild small bowel pneumatosis. There is rather more extensive portal venous gas throughout the left lobe and partly throughout the right lobe. The colon is generally decompressed, and loops of small bowel in the right mid abdomen/lower quadrant, presumably distal ileum are decompressed as well. There is a decompressed terminal ileum and normal appearing cecum and appendix. The transition point itself is difficult to pinpoint, but favored to be in the inferior anterior right abdomen, where a loop of dilated small bowel crosses from left-to-right, and is filled distally with semisolid material, but appears almost blind-ending. Please refer to axial images 111 through 121, and back up to image 1:15, varying from left to right. No inflammatory change or mass is seen here. Note is made of what appears to be the  "reservoir for a penile prosthesis in the left pelvis. The bladder is normally distended and normal in appearance.  Elsewhere in the upper abdomen, no significant abnormalities are seen of the pancreas, spleen, adrenal glands, or kidneys. Gallbladder is seen in a limited fashion, but appears grossly normal. Venous gas.  IMPRESSION: 1. Findings consistent with distal high-grade small bowel obstruction, with distal small bowel loops containing some pneumatosis, and with fairly extensive portal venous gas present. 2. No evidence of ascites or free air. No marked inflammatory process or marked bowel wall thickening. 3. Incidentally noted penile prosthesis  D:  06/30/2017 E:  06/30/2017  This report was finalized on 7/2/2017 12:32 PM by DR. Jose Cruz Kapoor MD.      Ct Soft Tissue Neck Without Contrast    Result Date: 7/2/2017  Narrative: EXAMINATION: CT CHEST WO CONTRAST-, CT SOFT TISSUE NECK WO CONTRAST-, CT ABDOMEN AND PELVIS WO CONTRAST-  INDICATION: Cancer, abdominal pain.  TECHNIQUE: Spiral acquisition unenhanced 5 mm axial images through the neck, chest, abdomen and pelvis.  The radiation dose reduction device was turned on for each scan per the ALARA (As Low as Reasonably Achievable) protocol.  COMPARISON: Most recent previous imaging study of the abdomen is 05/03/2013 abdomen and pelvis CT scan. No previous neck or chest CT scans.  FINDINGS: Patient history indicates constipation, abdominal pain and bloating, patient give history suggesting previous paracentesis, \"fluid drained from abdomen\", but no other details are given.  CT SCAN OF THE NECK WITHOUT CONTRAST: Image detail is somewhat limited without IV contrast, however, only shotty cervical lymph nodes are identified. No discrete mass or bulky adenopathy is seen. The largest nodes appear to be under 1 cm, in the right lateral lower neck. No pathologic adenopathy or acute inflammatory change is seen. There is no evidence of cervical airway stenosis. There does " appear to be some esophageal reflux, with fluid seen in the mid esophagus. Incidental note is made of moderate multilevel cervical spine degenerative disc disease but no evidence of acute bony trauma or significant focal subluxation. Sagittal images show grossly normal appearance of the cervical airway, including the glottis and epiglottis. No significant hypertrophy is seen of the tonsils or adenoids.      Impression: No significant abnormality is seen of the neck soft tissues for an unenhanced scan.  CT SCAN OF THE CHEST WITHOUT CONTRAST:   TECHNICAL: Exam consists of axial 5 mm unenhanced images through the chest.  FINDINGS: The esophagus is distended with fluid, apparently due to marked distention of the stomach as seen on the abdominal series images. No mediastinal adenopathy, pericardial or pleural effusion is seen. Lung window images show the lungs to appear clear except for mild patchy alveolar disease in the inferior right upper lobe, which could reflect pneumonia, or given history of gastric distention, small focus of aspiration.  IMPRESSION: 1. Fluid distended esophagus apparently due to gastric distention. 2. Patchy alveolar disease of the right upper lobe which may represent mild pneumonia or aspiration.  ABDOMEN AND PELVIS CT SCAN: As noted previously, stomach is markedly distended with fluid. Small bowel is distended, and there is evidence of at least mild small bowel pneumatosis. There is rather more extensive portal venous gas throughout the left lobe and partly throughout the right lobe. The colon is generally decompressed, and loops of small bowel in the right mid abdomen/lower quadrant, presumably distal ileum are decompressed as well. There is a decompressed terminal ileum and normal appearing cecum and appendix. The transition point itself is difficult to pinpoint, but favored to be in the inferior anterior right abdomen, where a loop of dilated small bowel crosses from left-to-right, and is  "filled distally with semisolid material, but appears almost blind-ending. Please refer to axial images 111 through 121, and back up to image 1:15, varying from left to right. No inflammatory change or mass is seen here. Note is made of what appears to be the reservoir for a penile prosthesis in the left pelvis. The bladder is normally distended and normal in appearance.  Elsewhere in the upper abdomen, no significant abnormalities are seen of the pancreas, spleen, adrenal glands, or kidneys. Gallbladder is seen in a limited fashion, but appears grossly normal. Venous gas.  IMPRESSION: 1. Findings consistent with distal high-grade small bowel obstruction, with distal small bowel loops containing some pneumatosis, and with fairly extensive portal venous gas present. 2. No evidence of ascites or free air. No marked inflammatory process or marked bowel wall thickening. 3. Incidentally noted penile prosthesis  D:  06/30/2017 E:  06/30/2017  This report was finalized on 7/2/2017 12:32 PM by DR. Jose Cruz Kapoor MD.      Ct Chest Without Contrast    Result Date: 7/2/2017  Narrative: EXAMINATION: CT CHEST WO CONTRAST-, CT SOFT TISSUE NECK WO CONTRAST-, CT ABDOMEN AND PELVIS WO CONTRAST-  INDICATION: Cancer, abdominal pain.  TECHNIQUE: Spiral acquisition unenhanced 5 mm axial images through the neck, chest, abdomen and pelvis.  The radiation dose reduction device was turned on for each scan per the ALARA (As Low as Reasonably Achievable) protocol.  COMPARISON: Most recent previous imaging study of the abdomen is 05/03/2013 abdomen and pelvis CT scan. No previous neck or chest CT scans.  FINDINGS: Patient history indicates constipation, abdominal pain and bloating, patient give history suggesting previous paracentesis, \"fluid drained from abdomen\", but no other details are given.  CT SCAN OF THE NECK WITHOUT CONTRAST: Image detail is somewhat limited without IV contrast, however, only shotty cervical lymph nodes are identified. No " discrete mass or bulky adenopathy is seen. The largest nodes appear to be under 1 cm, in the right lateral lower neck. No pathologic adenopathy or acute inflammatory change is seen. There is no evidence of cervical airway stenosis. There does appear to be some esophageal reflux, with fluid seen in the mid esophagus. Incidental note is made of moderate multilevel cervical spine degenerative disc disease but no evidence of acute bony trauma or significant focal subluxation. Sagittal images show grossly normal appearance of the cervical airway, including the glottis and epiglottis. No significant hypertrophy is seen of the tonsils or adenoids.      Impression: No significant abnormality is seen of the neck soft tissues for an unenhanced scan.  CT SCAN OF THE CHEST WITHOUT CONTRAST:   TECHNICAL: Exam consists of axial 5 mm unenhanced images through the chest.  FINDINGS: The esophagus is distended with fluid, apparently due to marked distention of the stomach as seen on the abdominal series images. No mediastinal adenopathy, pericardial or pleural effusion is seen. Lung window images show the lungs to appear clear except for mild patchy alveolar disease in the inferior right upper lobe, which could reflect pneumonia, or given history of gastric distention, small focus of aspiration.  IMPRESSION: 1. Fluid distended esophagus apparently due to gastric distention. 2. Patchy alveolar disease of the right upper lobe which may represent mild pneumonia or aspiration.  ABDOMEN AND PELVIS CT SCAN: As noted previously, stomach is markedly distended with fluid. Small bowel is distended, and there is evidence of at least mild small bowel pneumatosis. There is rather more extensive portal venous gas throughout the left lobe and partly throughout the right lobe. The colon is generally decompressed, and loops of small bowel in the right mid abdomen/lower quadrant, presumably distal ileum are decompressed as well. There is a  decompressed terminal ileum and normal appearing cecum and appendix. The transition point itself is difficult to pinpoint, but favored to be in the inferior anterior right abdomen, where a loop of dilated small bowel crosses from left-to-right, and is filled distally with semisolid material, but appears almost blind-ending. Please refer to axial images 111 through 121, and back up to image 1:15, varying from left to right. No inflammatory change or mass is seen here. Note is made of what appears to be the reservoir for a penile prosthesis in the left pelvis. The bladder is normally distended and normal in appearance.  Elsewhere in the upper abdomen, no significant abnormalities are seen of the pancreas, spleen, adrenal glands, or kidneys. Gallbladder is seen in a limited fashion, but appears grossly normal. Venous gas.  IMPRESSION: 1. Findings consistent with distal high-grade small bowel obstruction, with distal small bowel loops containing some pneumatosis, and with fairly extensive portal venous gas present. 2. No evidence of ascites or free air. No marked inflammatory process or marked bowel wall thickening. 3. Incidentally noted penile prosthesis  D:  06/30/2017 E:  06/30/2017  This report was finalized on 7/2/2017 12:32 PM by DR. Jose Cruz Kapoor MD.      Xr Chest 1 View    Result Date: 7/1/2017  Narrative:  EXAMINATION: XR CHEST, SINGLE VIEW - 06/30/2017  INDICATION: deep line placement pacu bay 6; K56.69-Other intestinal obstruction; K55.029-Acute infarction of small intestine, extent unspecified; D72.829-Elevated white blood cell count, unspecified; R10.9-Unspecified abdominal pain; R11.14-Bilious vomiting.  COMPARISON: None.  FINDINGS: 1. Mild nodular patchy densities are seen in the right mid peripheral lung and at both lung bases.  2. There is a right IJ large caliber catheter in place with the tip in the mid SVC in good position, and there is no pneumothorax.  3. Otherwise, no other acute findings are noted  when compared to earlier today.         Impression:  The new right IJ deep line is in the SVC, and there is no pneumothorax.  Some minimal patchy nodular densities are seen in the right mid lung deserving of careful follow-up or further evaluation.  There are mild atelectatic opacities at both lung bases.  DICTATED:     06/30/2017 EDITED:         06/30//2017  This report was finalized on 7/1/2017 1:30 PM by Dr. Zana Purvis MD.      Xr Chest 1 View    Result Date: 6/30/2017  Narrative: EXAMINATION: XR CHEST 1 VIEW-06/30/2017:  INDICATION: ? Aspiration.  COMPARISON: 06/29/2017 two-view chest.  FINDINGS: The heart, mediastinum and pulmonary vasculature appear within normal limits. Focal nodular density of the right upper-mid lung is unchanged and was evaluated with chest CT scanning earlier today. Chest CT would suggest that this represents a pneumonia. There is no evidence of interval progression of disease or new disease elsewhere.      Impression: 1.  Stable patchy disease of the right upper lung compared to prior study. 2.  No new chest disease is seen.  D:  06/30/2017 E:  06/30/2017  This report was finalized on 6/30/2017 11:46 PM by DR. Jose Cruz Kapoor MD.      Xr Abdomen Kub    Result Date: 6/30/2017  Narrative:  EXAMINATION: XR ABDOMEN KUB-  INDICATION: NAUSEA, VOMITTING; R11.2-Nausea with vomiting, unspecified  COMPARISON: NONE  FINDINGS: Supine radiograph abdomen demonstrates marked distended air-filled small bowel loops. There is no hepatosplenomegaly. There are no pathological abdominal calcifications.         Impression: There is fairly marked gaseous distention of mid and distal small bowel loops, worrisome for mechanical small bowel obstruction.  This report was finalized on 6/30/2017 9:26 AM by Dr. Ottoniel Garcia MD.      Xr Chest Pa And Lateral    Result Date: 6/30/2017  Narrative: EXAMINATION: XR CHEST PA AND LATERAL-  INDICATION: R11.2-Nausea with vomiting, unspecified.  COMPARISON: 12/07/2016.   FINDINGS: There are chronic obstructive pulmonary changes. There is a small area of nodularity in the right upper lobe which represents subtle interval change when compared to previous examination of 12/07/2016. Otherwise, there is no acute inflammatory process, mass or effusion.         Impression: Subtle vague area of nodularity in the right upper lobe, new when compared to 12/07/2016. CT scan of the chest is recommended for further evaluation. IV contrast would not be necessary.  D:  06/30/2017 E:  06/30/2017  This report was finalized on 6/30/2017 9:57 AM by Dr. Ottoniel Garcia MD.        ASSESSMENT: The patient is a very pleasant 87 y.o. male  with myelofibrosis      PLAN:  1.Small bowel obstruction: Status post exposure laparotomy with lysis of adhesions done by Dr. Serrano June 30, 2017. Abdominal pain is better.  2. Leukocytosis: Most likely reactive. Patient is currently on IV antibiotics. Thi is mush better.   3. 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  4.  Acute kidney injury: Most likely prerenal secondary to dehydration. Continue IV fluid. Monitor creatinine, its gradually improving.        Leny Thakur MD  7/3/2017

## 2017-07-03 NOTE — PROGRESS NOTES
Adult Nutrition  Assessment/PES    Patient Name:  Neil Houser  YOB: 1929  MRN: [id number]  Admit Date:  6/30/2017    Assessment Date:  7/3/2017        Reason for Assessment       07/03/17 1236    Reason for Assessment    Reason For Assessment/Visit follow up protocol;multidisciplinary rounds;TF/PN    Time Spent (min) 45    Gastrointestinal SBO   s/p exploratory lap, [facility]  6-30-17        Patient Active Problem List   Diagnosis   • Mild cognitive impairment   • Polycythemia   • Thrombocytosis   • Leukopenia   • Myelofibrosis   • SBO (small bowel obstruction)   • Acute on chronic renal failure               Labs/Tests/Procedures/Meds       07/03/17 1236    Labs/Tests/Procedures/Meds    Labs/Tests Review Reviewed, Mg++ replaced    Medication Review Reviewed, pertinent LR dc'd     Results from last 7 days  Lab Units 07/03/17  0408   SODIUM mmol/L 139  139   POTASSIUM mmol/L 4.1  4.1   CHLORIDE mmol/L 105  105   CO2 mmol/L 27.0  27.0   BUN mg/dL 36*  36*   CREATININE mg/dL 1.40*  1.40*   CALCIUM mg/dL 9.8  9.8   BILIRUBIN mg/dL 0.7  0.7   ALK PHOS U/L 32  32   ALT (SGPT) U/L 22  22   AST (SGOT) U/L 22  22   GLUCOSE mg/dL 120*  120*              Physical Findings       07/03/17 1237    Physical Findings/Assessment    Additional Documentation --   pt alert, sitting up in chair, no complaints, has hiccups, per RN: pt has not passed gas, no bm, ~50ml from ngt  today    Physical Appearance    Tubes nasogastric tube    Skin surgical wound            Estimated/Assessed Needs       07/03/17 1238    Estimated/Assessed Energy Needs    Energy Need Method Kcal/kg;[facility]    kcal/kg --   25-30kcal = 2102-2522kcal    Total estimated needs ([facility]) --   MSJ= 1553kcal    Estimated Kcal Range  2102-2522kcal    Estimated/Assessed Protein Needs    Estimated Protein Range 101-126g protein   1.2-1.5g protein per kg            Nutrition Prescription Ordered       07/03/17 1239    Nutrition  Prescription PO    Current PO Diet NPO    Nutrition Prescription PN    PN Route PICC    PN Goal Rate (mL/hr) 80 mL/hr    PN Current Rate (mL/hr) 80 mL/hr    PN Goal Volume (mL) 1920 mL    Dextrose Concentration (%) 25 %    Amino Acid Concentration (%) 7 %            Evaluation of Received Nutrient/Fluid Intake       07/03/17 1240    Evaluation of Received Nutrient/Fluid Intake    Nutrition Delivered Calorie Evaluation;Protein Evaluation    Calorie Intake Evaluation    Parenteral Calories (kcal) 1691    Total Calories (kcal) 1691    % Kcal Needs 80    Protein Intake Evaluation    Parenteral Protein (gm) 105    Total Protein (gm) 105    % Protein Needs 100            Evaluation of Prescribed Nutrient/Fluid Intake       07/03/17 1241    Evaluation of Prescribed Nutrient/Fluid Intake    Nutrition Prescribed Calorie Evaluation;Protein Evaluation    Calories at Prescribed Goal    Parenteral Calories (kcal) 2168    Total Calories (kcal) 2168    % Kcal Needs 100    Protein at Prescribed Goal    Parenteral Protein (gm) 134    Total Protein (gm) 134    % Protein Needs 106            Problem/Interventions:        Problem 1       07/03/17 1235    Nutrition Diagnoses Problem 1    Problem 1 Needs Alternate Route    Etiology (related to) Medical Diagnosis    Gastrointestinal SBO    Signs/Symptoms (evidenced by) NPO                    Intervention Goal       07/03/17 1242    Intervention Goal    General Nutrition support treatment    TF/PN Maintain TF/PN            Nutrition Intervention       07/03/17 1242    Nutrition Intervention    RD/Tech Action Await begin PO;Follow Tx progress;Care plan reviewd            Nutrition Prescription       07/03/17 1242    Nutrition Prescription PN    Parenteral Prescription Continue same protocal            Education/Evaluation       07/03/17 1242    Monitor/Evaluation    Monitor Per protocol;I&O;Pertinent labs;PN delivery/tolerance;Weight;Skin status;Symptoms          Electronically signed  by:  Irish Salcedo, BRANT  07/03/17 12:43 PM

## 2017-07-03 NOTE — PLAN OF CARE
Problem: Patient Care Overview (Adult)  Goal: Plan of Care Review  Outcome: Ongoing (interventions implemented as appropriate)    07/03/17 0624   Outcome Evaluation   Outcome Summary/Follow up Plan Pts VSS throughout the night. BS remain hypoactive. Intermittent hiccups are still present. Replacing mag this morning. Plan is to DC F/C and transfer to the floor today.        Goal: Adult Individualization and Mutuality  Outcome: Ongoing (interventions implemented as appropriate)  Goal: Discharge Needs Assessment  Outcome: Ongoing (interventions implemented as appropriate)    Problem: Perioperative Period (Adult)  Goal: Signs and Symptoms of Listed Potential Problems Will be Absent or Manageable (Perioperative Period)  Outcome: Ongoing (interventions implemented as appropriate)    Problem: Pressure Ulcer Risk (Jhonny Scale) (Adult,Obstetrics,Pediatric)  Goal: Skin Integrity  Outcome: Ongoing (interventions implemented as appropriate)

## 2017-07-04 PROBLEM — D69.6 THROMBOCYTOPENIA (HCC): Status: ACTIVE | Noted: 2017-01-01

## 2017-07-04 NOTE — PROGRESS NOTES
Pharmacy Parenteral Nutrition Evaluation    Neil Houser is a  87 y.o. male receiving TPN.     Indication: SBO  Consulting Physician: Zach    7/2: TPN advanced to goal rate. Increased K, Mg, and Phos today - PRUDENCIO improving.  7/3: TPN continues, no changes today. PRUDENCIO continues to improve.  7/4: TPN continues, PRUDENCIO continues to improve, to see if patient tolerates clears today, not yet weaning TPN    Labs  Results from last 7 days   Lab Units 07/04/17  0339 07/03/17 0408 07/02/17  0329   SODIUM mmol/L 135 139  139 137  137   POTASSIUM mmol/L 3.5 4.1  4.1 3.7  3.7   CHLORIDE mmol/L 107 105  105 103  103   CO2 mmol/L 22.0 27.0  27.0 28.0  28.0   BUN mg/dL 36* 36*  36* 42*  42*   CREATININE mg/dL 1.20 1.40*  1.40* 1.90*  1.90*   CALCIUM mg/dL 9.8 9.8  9.8 9.0  9.0   BILIRUBIN mg/dL 0.6 0.7  0.7 0.7  0.7   ALK PHOS U/L 41 32  32 32  32   ALT (SGPT) U/L 20 22  22 20  20   AST (SGOT) U/L 16 22  22 29  29   GLUCOSE mg/dL 145* 120*  120* 117*  117*       Results from last 7 days   Lab Units 07/04/17  0339 07/03/17  0408 07/02/17  0329 07/01/17  1353 07/01/17  0348   MAGNESIUM mg/dL 1.7 1.6  1.6 1.7  1.7 1.7 1.7   PHOSPHORUS mg/dL 2.5 2.4  2.4 2.3*  2.3* 3.5 3.8   PREALBUMIN mg/dL --  8.9* --  15.7 16.0       Results from last 7 days   Lab Units 07/04/17  0339 07/03/17  0408 07/02/17  0932   WBC 10*3/mm3 15.91* 15.68* 29.62*   HEMOGLOBIN g/dL 11.8* 11.8* 12.3*   HEMATOCRIT % 35.2* 34.8* 36.4*   PLATELETS 10*3/mm3 46* 50* 46*       estimated creatinine clearance is 47.6 mL/min (by C-G formula based on Cr of 1.2).    I/O last 3 completed shifts:  In: 4557.8 [I.V.:1106.4; Other:110; IV Piggyback:613]  Out: 5465 [Urine:5065; Other:400]      Dietician Recommendations  Diet Order   Procedures   • Diet Clear Liquid; Small Feedings       Current TPN Regimen Recommendation per dietary :  Dextrose 25% / Amino Acid 7% at goal rate of 80 mL/hr. No lipids for now. TPN now at goal.    Assessment/Plan:  PRUDENCIO  resolving, Scr improved today (1.2). Pt has CKD per H&P. Per notes, baseline Scr is 1.5.   TPN at goal rate of 80 mL/hr:  Na: 45 mEq/L  K: 40.5 mEq/L  Calcium: 5 mEq/L  Magnesium: 8 mEq/L  Phosphorus: 15 mmoL/L    TPN will include trace elements and MVI. Will watch electrolytes closely and adjust TPN as necessary.   Renal function improved again today. Mag, phos, and potassium were all replaced externally 7/4 AM. Plan to increase phos and potassium in TPN to standard central line concentrations as listed above.    Pharmacy will continue to follow closely, and make adjustments as needed. Thank you for this consult.  Sarah Alcazar, PharmD  7/4/2017  10:21 AM

## 2017-07-04 NOTE — PLAN OF CARE
Problem: Patient Care Overview (Adult)  Goal: Plan of Care Review  Outcome: Ongoing (interventions implemented as appropriate)    07/04/17 7177   Coping/Psychosocial Response Interventions   Plan Of Care Reviewed With patient   Patient Care Overview   Progress improving   Outcome Evaluation   Outcome Summary/Follow up Plan NGT remains clamped. No resduals. Up in chair, Ambulated in [place] with minimal assist. TLDL DC'd, PICC line placed. Diet advanced to clear liquids, tolerated well. TPN continues at goal rate. VSS. Hiccups remain intermittent.          Problem: Perioperative Period (Adult)  Goal: Signs and Symptoms of Listed Potential Problems Will be Absent or Manageable (Perioperative Period)  Outcome: Ongoing (interventions implemented as appropriate)    Problem: Pressure Ulcer Risk (Jhonny Scale) (Adult,Obstetrics,Pediatric)  Goal: Skin Integrity  Outcome: Ongoing (interventions implemented as appropriate)    Problem: Bowel Obstruction (Adult)  Goal: Signs and Symptoms of Listed Potential Problems Will be Absent or Manageable (Bowel Obstruction)  Outcome: Ongoing (interventions implemented as appropriate)

## 2017-07-04 NOTE — PLAN OF CARE
Problem: Patient Care Overview (Adult)  Goal: Plan of Care Review  Outcome: Ongoing (interventions implemented as appropriate)    07/04/17 0722   Coping/Psychosocial Response Interventions   Plan Of Care Reviewed With patient   Patient Care Overview   Progress improving   Outcome Evaluation   Outcome Summary/Follow up Plan Pts VSS throughout the night. NG remained clamped; pt tolerated well. Hiccups continue, but are becoming more intermittent. TPN continues @ goal rate, Mag and K+ replaced this am. Plan is for PICC to be placed today.        Goal: Adult Individualization and Mutuality  Outcome: Ongoing (interventions implemented as appropriate)  Goal: Discharge Needs Assessment  Outcome: Ongoing (interventions implemented as appropriate)    Problem: Perioperative Period (Adult)  Goal: Signs and Symptoms of Listed Potential Problems Will be Absent or Manageable (Perioperative Period)  Outcome: Ongoing (interventions implemented as appropriate)    Problem: Pressure Ulcer Risk (Jhonny Scale) (Adult,Obstetrics,Pediatric)  Goal: Skin Integrity  Outcome: Ongoing (interventions implemented as appropriate)    Problem: Bowel Obstruction (Adult)  Goal: Signs and Symptoms of Listed Potential Problems Will be Absent or Manageable (Bowel Obstruction)  Outcome: Ongoing (interventions implemented as appropriate)

## 2017-07-04 NOTE — PROGRESS NOTES
"Neil Houser  8/30/1929  [id number]    Surgery Progress Note    Date of visit: 7/4/2017    Subjective   Subjective: Pt feels better. No nausea. Passing gas.         Objective     Objective    /70 (BP Location: Left arm, Patient Position: Sitting)  Pulse 76  Temp 97.5 °F (36.4 °C) (Axillary)   Resp 20  Ht 72\" (182.9 cm)  Wt 185 lb (83.9 kg)  SpO2 97%  BMI 25.09 kg/m2    Intake/Output Summary (Last 24 hours) at 07/04/17 0859  Last data filed at 07/04/17 0802   Gross per 24 hour   Intake           2248.4 ml   Output             2875 ml   Net           -626.6 ml       CV:  Rhythm  regular and rate regular   L:  Clear  to auscultation bilaterally   Abd:  Bowel sounds positive , soft, less tender. Dressing c/d/i  Ext:  No cyanosis, clubbing, edema    Labs that are back at this time have been reviewed.        Assessment/Plan     Assessment/ Plan:    Hospital Problem List     Polycythemia    Overview Addendum 12/8/2016 12:57 PM by Robyn Dockery       JAK2 positive          Myelofibrosis    SBO (small bowel obstruction) - Doing better after Ex-lap. Clamp NG, start PO clears. Ambulate.    Acute on chronic renal failure                        Silver Guillen MD  7/4/2017  8:59 AM      "

## 2017-07-05 NOTE — PLAN OF CARE
Problem: Patient Care Overview (Adult)  Goal: Plan of Care Review  Outcome: Ongoing (interventions implemented as appropriate)    07/05/17 0615   Coping/Psychosocial Response Interventions   Plan Of Care Reviewed With patient   Patient Care Overview   Progress improving   Outcome Evaluation   Outcome Summary/Follow up Plan NGT remains clamped. No residuals. Up in chair this am. PICC dressing changed. TPN continues. No complaints of pain. ABdomen less distended. VSS. Bowel sounds hypoactive. Hiccups reamain almost constant.          Problem: Perioperative Period (Adult)  Goal: Signs and Symptoms of Listed Potential Problems Will be Absent or Manageable (Perioperative Period)  Outcome: Ongoing (interventions implemented as appropriate)    Problem: Pressure Ulcer Risk (Jhonny Scale) (Adult,Obstetrics,Pediatric)  Goal: Skin Integrity  Outcome: Ongoing (interventions implemented as appropriate)    Problem: Bowel Obstruction (Adult)  Goal: Signs and Symptoms of Listed Potential Problems Will be Absent or Manageable (Bowel Obstruction)  Outcome: Ongoing (interventions implemented as appropriate)

## 2017-07-05 NOTE — PLAN OF CARE
Problem: Patient Care Overview (Adult)  Goal: Plan of Care Review  Outcome: Ongoing (interventions implemented as appropriate)    07/05/17 1151   Coping/Psychosocial Response Interventions   Plan Of Care Reviewed With patient   Outcome Evaluation   Outcome Summary/Follow up Plan PT evaluation complete. No further IPPT goals established as pt demonstrates safety and appears to be at baseline re: functional mobility. Edu pt re: home safety and benefit of further ambulation with NSG. Will d/c PT at this time. Recommend d/c home with assist.

## 2017-07-05 NOTE — PROGRESS NOTES
"Intensivist Note     7/4/2017  Hospital Day: 4      Mr. Neil Houser, 87 y.o. male is followed for:  Principal Problem:    SBO status post laparotomy and lysis of adhesions 6/30/17  Active Problems:    Postop Acute on chronic renal failure. Improving    Postop Thrombocytopenia    Polycythemia requiring intermittent phlebotomy    Myelofibrosis       SUBJECTIVE     Subjective     86 y/o WM w/ h/o Polycythemia Vera and myelofibrosis/MDS, PUD/Esophagitis, BPD, CKD 3, ROBERTO, TIA, HLD, B12 deficiency who presented to the hospital 6/30/17 with coffee ground emesis and abdominal pain. CT scan revealed a high grade SBO w/ portal venous gas. Patient went to the operating room with Dr. Serrano where she found \"dilated proximal small bowel showing signs of early ischemia, one adhesial band RLQ, bowel viable after band released and untwisted, proximal small bowel filled with stool, no small bowel masses identified, colon normal throughout\". Patient was hemodynamically stable postoperatively after arrival in the intensive care unit on minimal O2 w/ NGT to LWS. He was maintained on TPN but just today has been advanced to clear liquids and seems to be tolerating them.    Has had some confusion but it seems to have cleared and is presently sitting up in a chair. Developed acute renal insufficiency but that continues to clear and creatinine is down to 1.2. Longoria catheter is out and he has excellent urine output.    The patient's relevant past medical, surgical and social history were reviewed and updated in Epic as appropriate.    OBJECTIVE     /68  Pulse 80  Temp 97.9 °F (36.6 °C) (Oral)   Resp 20  Ht 72\" (182.9 cm)  Wt 185 lb (83.9 kg)  SpO2 97%  BMI 25.09 kg/m2     Flow (L/min): 2    Flowsheet Rows         First Filed Value    Admission Height  72\" (182.9 cm) Documented at 06/30/2017 0856    Admission Weight  185 lb (83.9 kg) Documented at 06/30/2017 0856        Intake & Output (last day)       07/04 0701 - 07/05 0700 "    I.V. (mL/kg) 65.7 (0.8)    Other 40    IV Piggyback 300        Total Intake(mL/kg) 1371.7 (16.3)    Urine (mL/kg/hr) 620 (0.5)    Other 0 (0)    Total Output 620    Net +751.7               Objective    Exam:  General Exam:  Elderly white male looking younger than stated age  HEENT: Pupils equal and reactive. Nose and throat clear.  Neck:                          Supple, no JVD, thyromegaly, or adenopathy  Lungs: Clear anteriorly and posteriorly without wheezes or rales  Cardiovascular: Regular rate and rhythm without murmurs or gallops.  Abdomen: Distended but soft with positive bowel sounds dressing clean and dry   and rectal: Deferred.  Extremities: No cyanosis clubbing edema.  Neurologic:                 Symmetric strength. No focal deficits.    Chest X-Ray: Last film was 6/30/17 with some mild bibasilar atelectasis/infiltrate, and some minimal patchy infiltrate right mid lung    INFUSIONS    Adult Central 2-in-1 TPN  Last Rate: 80 mL/hr at 07/04/17 1801   Pharmacy Consult     Pharmacy to Dose TPN           Results from last 7 days  Lab Units 07/04/17  0339 07/03/17  0408 07/02/17  0932   WBC 10*3/mm3 15.91* 15.68* 29.62*   HEMOGLOBIN g/dL 11.8* 11.8* 12.3*   HEMATOCRIT % 35.2* 34.8* 36.4*   PLATELETS 10*3/mm3 46* 50* 46*       Results from last 7 days  Lab Units 07/04/17  1224 07/04/17  0339 07/03/17  0408   SODIUM mmol/L  --  135 139  139   POTASSIUM mmol/L 3.9 3.5 4.1  4.1   CHLORIDE mmol/L  --  107 105  105   CO2 mmol/L  --  22.0 27.0  27.0   BUN mg/dL  --  36* 36*  36*   CREATININE mg/dL  --  1.20 1.40*  1.40*   GLUCOSE mg/dL  --  145* 120*  120*   CALCIUM mg/dL  --  9.8 9.8  9.8       Results from last 7 days  Lab Units 07/04/17  0339 07/03/17  0408 07/02/17  0329   MAGNESIUM mg/dL 1.7 1.6  1.6 1.7  1.7   PHOSPHORUS mg/dL 2.5 2.4  2.4 2.3*  2.3*       No results found for: SEDRATE  No results found for: BNP  No results found for: CKTOTAL, CKMB, CKMBINDEX, TROPONINI, TROPONINT  No  results found for: TSH  Lab Results   Component Value Date    LACTATE 1.4 07/01/2017     No results found for: CORTISOL      I reviewed the patient's results, images and medication.    Assessment/Plan   ASSESSMENT      Principal Problem:    SBO status post laparotomy and lysis of adhesions 6/30/17  Active Problems:    Postop Acute on chronic renal failure. Improving    Postop Thrombocytopenia    Polycythemia requiring intermittent phlebotomy    Myelofibrosis      DISCUSSION: Considering his age he appears to be doing extremely well. Hopefully we will be able to advance his diet and can get him off of TPN soon. Low-dose heparin was started for DVT prophylaxis, but since his platelet count is low I am going to switch to Arixtra so I do not have to worry about the possibility of HIT (since his creatinine has now recovered O's (    PLAN     1. Use low-dose Arixtra for DVT prophylaxis (unless renal function worsens)  2. Orders for advancing diet per surgery  3. Mobilization /physical therapy    Plan of care and goals reviewed with mulitdisciplinary team at daily rounds.    I discussed the patient's findings and my recommendations with patient, family and nursing staff    Time spent Critical care 35 min (It does not include procedure time).    Jas Alvarado MD  Intensive Care Medicine  07/04/17 11:02 PM

## 2017-07-05 NOTE — THERAPY DISCHARGE NOTE
Acute Care - Physical Therapy Initial Eval/Discharge  [facility]     Patient Name: Neil Houser  : 1929  MRN: [id number]  Today's Date: 2017   Onset of Illness/Injury or Date of Surgery Date: 17  Date of Referral to PT: 17  Referring Physician: MD Christiano      Admit Date: 2017    Visit Dx:    ICD-10-CM ICD-9-CM   1. SBO (small bowel obstruction) K56.69 560.9   2. 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 Leukocytosis, unspecified type D72.829 288.60   4. Abdominal pain, unspecified location R10.9 789.00   5. Bilious vomiting without nausea R11.14 787.04   6. Impaired functional mobility, balance, gait, and endurance Z74.09 V49.89     Patient Active Problem List   Diagnosis   • Mild cognitive impairment   • Polycythemia requiring intermittent phlebotomy   • Thrombocytosis   • Leukopenia   • Myelofibrosis   • SBO status post laparotomy and lysis of adhesions 17   • Postop Acute on chronic renal failure. Improving   • Postop Thrombocytopenia     Past Medical History:   Diagnosis Date   • B12 deficiency    • CKD (chronic kidney disease), stage III    • Esophagitis    • HLD (hyperlipidemia)    • MDS (myelodysplastic syndrome)    • Mild cognitive impairment    • Myelofibrosis    • ROBERTO (obstructive sleep apnea)    • Polycythemia vera    • PUD (peptic ulcer disease)    • TIA (transient ischemic attack)      Past Surgical History:   Procedure Laterality Date   • EXPLORATORY LAPAROTOMY N/A 2017    Procedure: LAPAROTOMY EXPLORATORY;  Surgeon: Elena Serrano MD;  Location: [facility];  Service:           PT ASSESSMENT (last 72 hours)      PT Evaluation       17 1011 17 1156    Rehab Evaluation    Document Type evaluation;discharge summary  -LS     Subjective Information agree to therapy;no complaints  -LS     Patient Effort, Rehab Treatment excellent  -LS     General Information    Patient Profile Review yes  -LS     Onset of Illness/Injury or Date of Surgery  Date 06/30/17  -     Referring Physician MD Christiano  -     Pertinent History Of Current Problem Admitted with abd pain/distention, N/V x5 days. CT abd demonstrated bowel ischemia; s/p emergent ex lap with lysis of adhesions on 6/30.   -LS     Precautions/Limitations no known precautions/limitations  -LS     Prior Level of Function independent:;gait;transfer;ADL's;bathing;dressing;driving  -LS     Equipment Currently Used at Home none  -LS none  -PP    Plans/Goals Discussed With patient;agreed upon  -LS     Risks Reviewed patient:;LOB;dizziness;increased discomfort;change in vital signs  -LS     Benefits Reviewed patient:;improve function;increase independence;increase strength;increase balance;increase knowledge  -LS     Barriers to Rehab none identified  -LS     Living Environment    Lives With alone;other (see comments)   pt has live-in staff  -LS alone;other (see comments)   has live in staff  -PP    Living Arrangements house  -LS house  -PP    Home Accessibility stairs within home  -LS     Number of Stairs Within Home --   has bedroom on 1st level he can use if necessary  -LS     Transportation Available  car;family or friend will provide  -PP    Clinical Impression    Date of Referral to PT 07/04/17  -LS     PT Diagnosis impaired functional mobility   -LS     Patient/Family Goals Statement return to PLOF; go home  -     Criteria for Skilled Therapeutic Interventions Met no problems identified which require skilled intervention;current level of function same as previous level of function  -LS     Rehab Potential other (see comments)   no goals established; pt at baseline  -LS     Vital Signs    Pre Systolic BP Rehab 112  -LS     Pre Treatment Diastolic BP 60  -LS     Pretreatment Heart Rate (beats/min) 80  -LS     Posttreatment Heart Rate (beats/min) 90  -LS     Pre SpO2 (%) 99  -LS     O2 Delivery Pre Treatment room air  -LS     Post SpO2 (%) 94  -LS     O2 Delivery Post Treatment room air  -LS     Pre  Patient Position Sitting  -LS     Intra Patient Position Standing  -LS     Post Patient Position Sitting  -LS     Pain Assessment    Pain Assessment 0-10  -LS     Pain Score 0  -LS     Post Pain Score 0  -LS     Cognitive Assessment/Intervention    Current Cognitive/Communication Assessment functional  -LS     Orientation Status oriented x 4  -LS     Follows Commands/Answers Questions able to follow single-step instructions;100% of the time  -LS     Personal Safety WNL/WFL  -LS     Personal Safety Interventions fall prevention program maintained;gait belt;nonskid shoes/slippers when out of bed  -LS     ROM (Range of Motion)    General ROM no range of motion deficits identified  -LS     MMT (Manual Muscle Testing)    General MMT Assessment lower extremity strength deficits identified  -LS     Lower Extremity    Lower Ext Manual Muscle Testing Detail BLE grossly 4+/5  -LS     Bed Mobility, Assessment/Treatment    Bed Mobility, Comment UIC upon arrival.  -LS     Transfer Assessment/Treatment    Transfers, Sit-Stand Henry independent  -LS     Transfers, Stand-Sit Henry independent  -LS     Gait Assessment/Treatment    Gait, Henry Level supervision required  -LS     Gait, Distance (Feet) 700  -LS     Gait, Comment Supervision provided for management of lines; no noted LOB.   -LS     Motor Skills/Interventions    Additional Documentation Balance Skills Training (Group)  -LS     Balance Skills Training    Sitting-Level of Assistance Independent  -LS     Sitting-Balance Support Feet supported  -LS     Standing-Level of Assistance Independent  -LS     Static Standing Balance Support No upper extremity supported  -LS     Sensory Assessment/Intervention    Light Touch RLE;LLE  -LS     LLE Light Touch WNL  -LS     RLE Light Touch WNL  -LS     Positioning and Restraints    Pre-Treatment Position sitting in chair/recliner  -LS     Post Treatment Position chair  -LS     In Chair notified nsg;call light within  reach;encouraged to call for assist;exit alarm on;sitting  -LS       User Key  (r) = Recorded By, (t) = Taken By, (c) = Cosigned By    Initials Name Provider Type    LS Ledy Carr PT Physical Therapist    JENSEN Osborne, RN Case Manager          Physical Therapy Education     Title: PT OT SLP Therapies (Done)     Topic: Physical Therapy (Done)     Point: Mobility training (Done)    Learning Progress Summary    Learner Readiness Method Response Comment Documented by Status   Patient Acceptance ART TRUONG RAFFI BREWSTER 07/05/17 1150 Done               Point: Home exercise program (Done)    Learning Progress Summary    Learner Readiness Method Response Comment Documented by Status   Patient Acceptance DIONNERAFFI VALENTIN 07/05/17 1150 Done               Point: Body mechanics (Done)    Learning Progress Summary    Learner Readiness Method Response Comment Documented by Status   Patient Acceptance DIONNERAFFI VALENTIN 07/05/17 1150 Done               Point: Precautions (Done)    Learning Progress Summary    Learner Readiness Method Response Comment Documented by Status   Patient Acceptance DIONNERAFFI VALENTIN 07/05/17 1150 Done                      User Key     Initials Effective Dates Name Provider Type [facility] 06/19/15 -  Ledy Carr, PT Physical Therapist PT                PT Recommendation and Plan  Anticipated Discharge Disposition: home with assist  PT Frequency: evaluation only  Plan of Care Review  Plan Of Care Reviewed With: patient  Outcome Summary/Follow up Plan: PT evaluation complete. No further IPPT goals established as pt demonstrates safety and appears to be at baseline re: functional mobility. Edu pt re: home safety and benefit of further ambulation with NSG. Will d/c PT at this time. Recommend d/c home with assist.               Outcome Measures       07/05/17 1011          How much help from another person do you currently need...    Turning from your back to your side while in flat bed without using  bedrails? 4  -LS      Moving from lying on back to sitting on the side of a flat bed without bedrails? 4  -LS      Moving to and from a bed to a chair (including a wheelchair)? 4  -LS      Standing up from a chair using your arms (e.g., wheelchair, bedside chair)? 4  -LS      Climbing 3-5 steps with a railing? 3  -LS      To walk in hospital room? 3  -LS      AM-PAC 6 Clicks Score 22  -LS      Functional Assessment    Outcome Measure Options AM-PAC 6 Clicks Basic Mobility (PT)  -LS        User Key  (r) = Recorded By, (t) = Taken By, (c) = Cosigned By    Initials Name Provider Type    ANDRADE Carr PT Physical Therapist           Time Calculation:         PT Charges       07/05/17 1154          Time Calculation    Start Time 1011  -LS      PT Received On 07/05/17  -LS        User Key  (r) = Recorded By, (t) = Taken By, (c) = Cosigned By    Initials Name Provider Type     Ledy Carr, PT Physical Therapist          Therapy Charges for Today     Code Description Service Date Service Provider Modifiers Qty    [id number]  PT EVAL LOW COMPLEXITY 4 7/5/2017 Ledy Carr, PT GP 1          PT G-Codes  Outcome Measure Options: AM-PAC 6 Clicks Basic Mobility (PT)    PT Discharge Summary  Anticipated Discharge Disposition: home with assist  Reason for Discharge: At baseline function  Discharge Destination: Home with assist    Ledy Carr, PT  7/5/2017

## 2017-07-05 NOTE — PROGRESS NOTES
DATE OF VISIT: 7/5/2017    Chief Complain: Followup for Myelofibrosis     SUBJECTIVE: The patient has been doing fairly well.  He  denied any fever or  chills, no night sweats, denied any headaches. His abdominal pain is better. He is complaining of hiccups.  NG tube has been removed.    REVIEW OF SYSTEMS: All the other 9 systems are reviewed by me and negative  except what is mentioned in HPI.     PAST MEDICAL HISTORY/SOCIAL HISTORY/FAMILY HISTORY: Unchanged from my prior documentation done on 07/01/2017.      Current Facility-Administered Medications:   •  Adult Central 2-in-1 TPN, , Intravenous, Continuous, [facility], Last Rate: 80 mL/hr at 07/04/17 1801  •  dextrose (D50W) solution 25 g, 25 g, Intravenous, Q15 Min PRN, Star Razo MD  •  dextrose (GLUTOSE) oral gel 15 g, 15 g, Oral, Q15 Min PRN, Star Razo MD  •  diazePAM (VALIUM) injection 2.5 mg, 2.5 mg, Intravenous, Q4H PRN, Abdiaziz Maya MD, 2.5 mg at 07/05/17 0110  •  fondaparinux (ARIXTRA) injection 2.5 mg, 2.5 mg, Subcutaneous, Q24H, Jas Alvarado MD, 2.5 mg at 07/05/17 0836  •  glucagon (GLUCAGEN) injection 1 mg, 1 mg, Subcutaneous, Q15 Min PRN, Star Razo MD  •  insulin regular (humuLIN R,novoLIN R) injection 0-7 Units, 0-7 Units, Subcutaneous, Q6H, Abdiaziz Maya MD  •  magnesium sulfate 4 gram infusion- Mg less than or equal to 1 mg/dL, 4 g, Intravenous, PRN **OR** magnesium sulfate 3 gram infusion (1gm x 3) - Mg 1.1 - 1.5 mg/dL, 1 g, Intravenous, PRN **OR** Magnesium Sulfate 2 gram infusion- Mg 1.6 - 1.9 mg/dL, 2 g, Intravenous, PRN, Abdiaziz Maya MD, Last Rate: 25 mL/hr at 07/04/17 0519, 2 g at 07/04/17 0519  •  Morphine injection 4 mg, 4 mg, Intravenous, Q2H PRN **AND** naloxone (NARCAN) injection 0.4 mg, 0.4 mg, Intravenous, Q5 Min PRN, Elena Serrano MD  •  ondansetron (ZOFRAN) tablet 4 mg, 4 mg, Oral, Q6H PRN **OR** ondansetron (ZOFRAN) injection 4 mg, 4 mg,  "Intravenous, Q6H PRN, Elena Serrano MD  •  pantoprazole (PROTONIX) injection 40 mg, 40 mg, Intravenous, Q AM, Star Razo MD, 40 mg at 07/05/17 0529  •  Pharmacy to Dose TPN, , Does not apply, Continuous PRN, Elena Serrano MD  •  piperacillin-tazobactam (ZOSYN) 3.375 g/100 mL 0.9% NS IVPB (mbp), 3.375 g, Intravenous, Q6H, Elena Serrano MD, 3.375 g at 07/05/17 0840  •  potassium chloride 20 mEq in 50 mL IVPB, 20 mEq, Intravenous, Q1H PRN, Marzena Lawson, [facility], Last Rate: 50 mL/hr at 07/04/17 0641, 20 mEq at 07/04/17 0641  •  potassium phosphate 45 mmol in sodium chloride 0.9 % 250 mL infusion, 45 mmol, Intravenous, PRN **OR** potassium phosphate 30 mmol in sodium chloride 0.9 % 100 mL infusion, 30 mmol, Intravenous, PRN **OR** potassium phosphate 15 mmol in sodium chloride 0.9 % 100 mL infusion, 15 mmol, Intravenous, PRN, Last Rate: 50 mL/hr at 07/02/17 1336, 15 mmol at 07/02/17 1336 **OR** sodium phosphates 45 mmol in sodium chloride 0.9 % 250 mL IVPB, 45 mmol, Intravenous, PRN **OR** sodium phosphates 30 mmol in sodium chloride 0.9 % 100 mL IVPB, 30 mmol, Intravenous, PRN **OR** sodium phosphates 15 mmol in sodium chloride 0.9 % 100 mL IVPB, 15 mmol, Intravenous, PRN, Abdiaziz Maya MD  •  sennosides (SENOKOT) 8.8 MG/5ML syrup 10 mL, 10 mL, Oral, BID, Star Razo MD  •  sodium chloride 0.9 % flush 10 mL, 10 mL, Intravenous, PRN, PEPPER Ch  •  sodium chloride 0.9 % flush 10 mL, 10 mL, Intracatheter, PRN, Star aRzo MD    PHYSICAL EXAMINATION:   /60  Pulse 76  Temp 98.1 °F (36.7 °C) (Oral)   Resp 20  Ht 72\" (182.9 cm)  Wt 185 lb (83.9 kg)  SpO2 97%  BMI 25.09 kg/m2  GENERAL: Age appropriate. No acute distress.   HEENT: Head atraumatic, normocephalic.   NECK: Supple. No JVD. No lymphadenopathy.   LUNGS: Clear to auscultation bilaterally. No wheezing. No rhonchi.   HEART: Regular rate and rhythm. S1, S2, no murmurs.   ABDOMEN: Soft, " "nontender, nondistended. Bowel sounds positive. No  hepatosplenomegaly.   EXTREMITIES: No clubbing, cyanosis, or edema.   SKIN: No rashes. No purpura.   NEUROLOGIC: Awake and oriented x3. Strength 5 out of 5 in all muscle groups.     Admission on 06/30/2017   No results displayed because visit has over 200 results.          Ct Abdomen Pelvis Without Contrast    Result Date: 7/2/2017  Narrative: EXAMINATION: CT CHEST WO CONTRAST-, CT SOFT TISSUE NECK WO CONTRAST-, CT ABDOMEN AND PELVIS WO CONTRAST-  INDICATION: Cancer, abdominal pain.  TECHNIQUE: Spiral acquisition unenhanced 5 mm axial images through the neck, chest, abdomen and pelvis.  The radiation dose reduction device was turned on for each scan per the ALARA (As Low as Reasonably Achievable) protocol.  COMPARISON: Most recent previous imaging study of the abdomen is 05/03/2013 abdomen and pelvis CT scan. No previous neck or chest CT scans.  FINDINGS: Patient history indicates constipation, abdominal pain and bloating, patient give history suggesting previous paracentesis, \"fluid drained from abdomen\", but no other details are given.  CT SCAN OF THE NECK WITHOUT CONTRAST: Image detail is somewhat limited without IV contrast, however, only shotty cervical lymph nodes are identified. No discrete mass or bulky adenopathy is seen. The largest nodes appear to be under 1 cm, in the right lateral lower neck. No pathologic adenopathy or acute inflammatory change is seen. There is no evidence of cervical airway stenosis. There does appear to be some esophageal reflux, with fluid seen in the mid esophagus. Incidental note is made of moderate multilevel cervical spine degenerative disc disease but no evidence of acute bony trauma or significant focal subluxation. Sagittal images show grossly normal appearance of the cervical airway, including the glottis and epiglottis. No significant hypertrophy is seen of the tonsils or adenoids.      Impression: No significant " abnormality is seen of the neck soft tissues for an unenhanced scan.  CT SCAN OF THE CHEST WITHOUT CONTRAST:   TECHNICAL: Exam consists of axial 5 mm unenhanced images through the chest.  FINDINGS: The esophagus is distended with fluid, apparently due to marked distention of the stomach as seen on the abdominal series images. No mediastinal adenopathy, pericardial or pleural effusion is seen. Lung window images show the lungs to appear clear except for mild patchy alveolar disease in the inferior right upper lobe, which could reflect pneumonia, or given history of gastric distention, small focus of aspiration.  IMPRESSION: 1. Fluid distended esophagus apparently due to gastric distention. 2. Patchy alveolar disease of the right upper lobe which may represent mild pneumonia or aspiration.  ABDOMEN AND PELVIS CT SCAN: As noted previously, stomach is markedly distended with fluid. Small bowel is distended, and there is evidence of at least mild small bowel pneumatosis. There is rather more extensive portal venous gas throughout the left lobe and partly throughout the right lobe. The colon is generally decompressed, and loops of small bowel in the right mid abdomen/lower quadrant, presumably distal ileum are decompressed as well. There is a decompressed terminal ileum and normal appearing cecum and appendix. The transition point itself is difficult to pinpoint, but favored to be in the inferior anterior right abdomen, where a loop of dilated small bowel crosses from left-to-right, and is filled distally with semisolid material, but appears almost blind-ending. Please refer to axial images 111 through 121, and back up to image 1:15, varying from left to right. No inflammatory change or mass is seen here. Note is made of what appears to be the reservoir for a penile prosthesis in the left pelvis. The bladder is normally distended and normal in appearance.  Elsewhere in the upper abdomen, no significant abnormalities are  "seen of the pancreas, spleen, adrenal glands, or kidneys. Gallbladder is seen in a limited fashion, but appears grossly normal. Venous gas.  IMPRESSION: 1. Findings consistent with distal high-grade small bowel obstruction, with distal small bowel loops containing some pneumatosis, and with fairly extensive portal venous gas present. 2. No evidence of ascites or free air. No marked inflammatory process or marked bowel wall thickening. 3. Incidentally noted penile prosthesis  D:  06/30/2017 E:  06/30/2017  This report was finalized on 7/2/2017 12:32 PM by DR. Jose Cruz Kapoor MD.      Ct Soft Tissue Neck Without Contrast    Result Date: 7/2/2017  Narrative: EXAMINATION: CT CHEST WO CONTRAST-, CT SOFT TISSUE NECK WO CONTRAST-, CT ABDOMEN AND PELVIS WO CONTRAST-  INDICATION: Cancer, abdominal pain.  TECHNIQUE: Spiral acquisition unenhanced 5 mm axial images through the neck, chest, abdomen and pelvis.  The radiation dose reduction device was turned on for each scan per the ALARA (As Low as Reasonably Achievable) protocol.  COMPARISON: Most recent previous imaging study of the abdomen is 05/03/2013 abdomen and pelvis CT scan. No previous neck or chest CT scans.  FINDINGS: Patient history indicates constipation, abdominal pain and bloating, patient give history suggesting previous paracentesis, \"fluid drained from abdomen\", but no other details are given.  CT SCAN OF THE NECK WITHOUT CONTRAST: Image detail is somewhat limited without IV contrast, however, only shotty cervical lymph nodes are identified. No discrete mass or bulky adenopathy is seen. The largest nodes appear to be under 1 cm, in the right lateral lower neck. No pathologic adenopathy or acute inflammatory change is seen. There is no evidence of cervical airway stenosis. There does appear to be some esophageal reflux, with fluid seen in the mid esophagus. Incidental note is made of moderate multilevel cervical spine degenerative disc disease but no evidence of " acute bony trauma or significant focal subluxation. Sagittal images show grossly normal appearance of the cervical airway, including the glottis and epiglottis. No significant hypertrophy is seen of the tonsils or adenoids.      Impression: No significant abnormality is seen of the neck soft tissues for an unenhanced scan.  CT SCAN OF THE CHEST WITHOUT CONTRAST:   TECHNICAL: Exam consists of axial 5 mm unenhanced images through the chest.  FINDINGS: The esophagus is distended with fluid, apparently due to marked distention of the stomach as seen on the abdominal series images. No mediastinal adenopathy, pericardial or pleural effusion is seen. Lung window images show the lungs to appear clear except for mild patchy alveolar disease in the inferior right upper lobe, which could reflect pneumonia, or given history of gastric distention, small focus of aspiration.  IMPRESSION: 1. Fluid distended esophagus apparently due to gastric distention. 2. Patchy alveolar disease of the right upper lobe which may represent mild pneumonia or aspiration.  ABDOMEN AND PELVIS CT SCAN: As noted previously, stomach is markedly distended with fluid. Small bowel is distended, and there is evidence of at least mild small bowel pneumatosis. There is rather more extensive portal venous gas throughout the left lobe and partly throughout the right lobe. The colon is generally decompressed, and loops of small bowel in the right mid abdomen/lower quadrant, presumably distal ileum are decompressed as well. There is a decompressed terminal ileum and normal appearing cecum and appendix. The transition point itself is difficult to pinpoint, but favored to be in the inferior anterior right abdomen, where a loop of dilated small bowel crosses from left-to-right, and is filled distally with semisolid material, but appears almost blind-ending. Please refer to axial images 111 through 121, and back up to image 1:15, varying from left to right. No  "inflammatory change or mass is seen here. Note is made of what appears to be the reservoir for a penile prosthesis in the left pelvis. The bladder is normally distended and normal in appearance.  Elsewhere in the upper abdomen, no significant abnormalities are seen of the pancreas, spleen, adrenal glands, or kidneys. Gallbladder is seen in a limited fashion, but appears grossly normal. Venous gas.  IMPRESSION: 1. Findings consistent with distal high-grade small bowel obstruction, with distal small bowel loops containing some pneumatosis, and with fairly extensive portal venous gas present. 2. No evidence of ascites or free air. No marked inflammatory process or marked bowel wall thickening. 3. Incidentally noted penile prosthesis  D:  06/30/2017 E:  06/30/2017  This report was finalized on 7/2/2017 12:32 PM by DR. Jose Cruz Kapoor MD.      Ct Chest Without Contrast    Result Date: 7/2/2017  Narrative: EXAMINATION: CT CHEST WO CONTRAST-, CT SOFT TISSUE NECK WO CONTRAST-, CT ABDOMEN AND PELVIS WO CONTRAST-  INDICATION: Cancer, abdominal pain.  TECHNIQUE: Spiral acquisition unenhanced 5 mm axial images through the neck, chest, abdomen and pelvis.  The radiation dose reduction device was turned on for each scan per the ALARA (As Low as Reasonably Achievable) protocol.  COMPARISON: Most recent previous imaging study of the abdomen is 05/03/2013 abdomen and pelvis CT scan. No previous neck or chest CT scans.  FINDINGS: Patient history indicates constipation, abdominal pain and bloating, patient give history suggesting previous paracentesis, \"fluid drained from abdomen\", but no other details are given.  CT SCAN OF THE NECK WITHOUT CONTRAST: Image detail is somewhat limited without IV contrast, however, only shotty cervical lymph nodes are identified. No discrete mass or bulky adenopathy is seen. The largest nodes appear to be under 1 cm, in the right lateral lower neck. No pathologic adenopathy or acute inflammatory change is " seen. There is no evidence of cervical airway stenosis. There does appear to be some esophageal reflux, with fluid seen in the mid esophagus. Incidental note is made of moderate multilevel cervical spine degenerative disc disease but no evidence of acute bony trauma or significant focal subluxation. Sagittal images show grossly normal appearance of the cervical airway, including the glottis and epiglottis. No significant hypertrophy is seen of the tonsils or adenoids.      Impression: No significant abnormality is seen of the neck soft tissues for an unenhanced scan.  CT SCAN OF THE CHEST WITHOUT CONTRAST:   TECHNICAL: Exam consists of axial 5 mm unenhanced images through the chest.  FINDINGS: The esophagus is distended with fluid, apparently due to marked distention of the stomach as seen on the abdominal series images. No mediastinal adenopathy, pericardial or pleural effusion is seen. Lung window images show the lungs to appear clear except for mild patchy alveolar disease in the inferior right upper lobe, which could reflect pneumonia, or given history of gastric distention, small focus of aspiration.  IMPRESSION: 1. Fluid distended esophagus apparently due to gastric distention. 2. Patchy alveolar disease of the right upper lobe which may represent mild pneumonia or aspiration.  ABDOMEN AND PELVIS CT SCAN: As noted previously, stomach is markedly distended with fluid. Small bowel is distended, and there is evidence of at least mild small bowel pneumatosis. There is rather more extensive portal venous gas throughout the left lobe and partly throughout the right lobe. The colon is generally decompressed, and loops of small bowel in the right mid abdomen/lower quadrant, presumably distal ileum are decompressed as well. There is a decompressed terminal ileum and normal appearing cecum and appendix. The transition point itself is difficult to pinpoint, but favored to be in the inferior anterior right abdomen, where  a loop of dilated small bowel crosses from left-to-right, and is filled distally with semisolid material, but appears almost blind-ending. Please refer to axial images 111 through 121, and back up to image 1:15, varying from left to right. No inflammatory change or mass is seen here. Note is made of what appears to be the reservoir for a penile prosthesis in the left pelvis. The bladder is normally distended and normal in appearance.  Elsewhere in the upper abdomen, no significant abnormalities are seen of the pancreas, spleen, adrenal glands, or kidneys. Gallbladder is seen in a limited fashion, but appears grossly normal. Venous gas.  IMPRESSION: 1. Findings consistent with distal high-grade small bowel obstruction, with distal small bowel loops containing some pneumatosis, and with fairly extensive portal venous gas present. 2. No evidence of ascites or free air. No marked inflammatory process or marked bowel wall thickening. 3. Incidentally noted penile prosthesis  D:  06/30/2017 E:  06/30/2017  This report was finalized on 7/2/2017 12:32 PM by DR. Jose Cruz Kapoor MD.      Xr Chest 1 View    Result Date: 7/5/2017  Narrative: EXAMINATION: XR CHEST 1 VW- 07/05/2017  INDICATION: Respiratory failure; K56.69-Other intestinal obstruction; K55.029-Acute infarction of small intestine, extent unspecified; D72.829-Elevated white blood cell count, unspecified; R10.9-Unspecified abdominal pain; R11.14-Bilious vomiting  COMPARISON: NONE  FINDINGS: 1. The lungs are clear. The heart is normal. 2. There is marked arthropathy of the dorsal spine with bridging osteophytes. 3. Nasogastric tube is in the stomach.         Impression: 1. Obstructive lung disease is noted. Dorsal arthropathy is noted. 2. Left PICC line is well-positioned with the tip in the SVC above the right atrium, perfectly positioned. 3. Otherwise, there is no new or active disease in the chest. Heart size remains normal.  D:  07/05/2017 E:  07/05/2017       Xr Chest  1 View    Result Date: 7/1/2017  Narrative:  EXAMINATION: XR CHEST, SINGLE VIEW - 06/30/2017  INDICATION: deep line placement pacu bay 6; K56.69-Other intestinal obstruction; K55.029-Acute infarction of small intestine, extent unspecified; D72.829-Elevated white blood cell count, unspecified; R10.9-Unspecified abdominal pain; R11.14-Bilious vomiting.  COMPARISON: None.  FINDINGS: 1. Mild nodular patchy densities are seen in the right mid peripheral lung and at both lung bases.  2. There is a right IJ large caliber catheter in place with the tip in the mid SVC in good position, and there is no pneumothorax.  3. Otherwise, no other acute findings are noted when compared to earlier today.         Impression:  The new right IJ deep line is in the SVC, and there is no pneumothorax.  Some minimal patchy nodular densities are seen in the right mid lung deserving of careful follow-up or further evaluation.  There are mild atelectatic opacities at both lung bases.  DICTATED:     06/30/2017 EDITED:         06/30//2017  This report was finalized on 7/1/2017 1:30 PM by Dr. Zana uPrvis MD.      Xr Chest 1 View    Result Date: 6/30/2017  Narrative: EXAMINATION: XR CHEST 1 VIEW-06/30/2017:  INDICATION: ? Aspiration.  COMPARISON: 06/29/2017 two-view chest.  FINDINGS: The heart, mediastinum and pulmonary vasculature appear within normal limits. Focal nodular density of the right upper-mid lung is unchanged and was evaluated with chest CT scanning earlier today. Chest CT would suggest that this represents a pneumonia. There is no evidence of interval progression of disease or new disease elsewhere.      Impression: 1.  Stable patchy disease of the right upper lung compared to prior study. 2.  No new chest disease is seen.  D:  06/30/2017 E:  06/30/2017  This report was finalized on 6/30/2017 11:46 PM by DR. Jose Cruz Kapoor MD.      Xr Abdomen Kub    Result Date: 6/30/2017  Narrative:  EXAMINATION: XR ABDOMEN KUB-  INDICATION: NAUSEA,  VOMITTING; R11.2-Nausea with vomiting, unspecified  COMPARISON: NONE  FINDINGS: Supine radiograph abdomen demonstrates marked distended air-filled small bowel loops. There is no hepatosplenomegaly. There are no pathological abdominal calcifications.         Impression: There is fairly marked gaseous distention of mid and distal small bowel loops, worrisome for mechanical small bowel obstruction.  This report was finalized on 6/30/2017 9:26 AM by Dr. Ottoniel Garcia MD.      Xr Chest Pa And Lateral    Result Date: 6/30/2017  Narrative: EXAMINATION: XR CHEST PA AND LATERAL-  INDICATION: R11.2-Nausea with vomiting, unspecified.  COMPARISON: 12/07/2016.  FINDINGS: There are chronic obstructive pulmonary changes. There is a small area of nodularity in the right upper lobe which represents subtle interval change when compared to previous examination of 12/07/2016. Otherwise, there is no acute inflammatory process, mass or effusion.         Impression: Subtle vague area of nodularity in the right upper lobe, new when compared to 12/07/2016. CT scan of the chest is recommended for further evaluation. IV contrast would not be necessary.  D:  06/30/2017 E:  06/30/2017  This report was finalized on 6/30/2017 9:57 AM by Dr. Ottoniel Garcia MD.        ASSESSMENT: The patient is a very pleasant 87 y.o. male  with myelofibrosis      PLAN:  1.Small bowel obstruction: Status post exposure laparotomy with lysis of adhesions done by Dr. Serrano June 30, 2017. Abdominal pain is better.  2. Leukocytosis: Most likely reactive. Patient is currently on IV antibiotics.    3. 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  4.  Acute kidney injury: Most likely prerenal secondary to dehydration. Continue IV fluid. Monitor creatinine, its gradually improving.    5.  Thrombocytopenia: Secondary to recent surgery as well as underlying myelofibrosis.  Patient baseline platelet count is close to normal.  This should gradually  improve.  6.  Hiccups: Baclofen as needed.  Leny Thakur MD  7/5/2017

## 2017-07-05 NOTE — PROGRESS NOTES
Continued Stay Note  [facility]     Patient Name: Neil Houser  MRN: [id number]  Today's Date: 7/5/2017    Admit Date: 6/30/2017          Discharge Plan       07/05/17 1556    Case Management/Social Work Plan    Additional Comments Mary with [facility] called and pt's caregiver had stopped at their office and provided pt's information to company as they would like for him to have [facility]. CM will continue to follow and assist with discharge needs.               Discharge Codes     None        Expected Discharge Date and Time     Expected Discharge Date Expected Discharge Time    Jul 6, 2017             Nemo Fagan

## 2017-07-05 NOTE — PROGRESS NOTES
Adult Nutrition  Assessment/PES    Patient Name:  Neil Houser  YOB: 1929  MRN: [id number]  Admit Date:  6/30/2017     Pt tolerating clear liquids well,  consider weaning PN once diet advanced    Assessment Date:  7/5/2017        Reason for Assessment       07/05/17 1311    Reason for Assessment    Reason For Assessment/Visit follow up protocol;multidisciplinary rounds;TF/PN    Time Spent (min) 30    Gastrointestinal SBO        Patient Active Problem List   Diagnosis   • Mild cognitive impairment   • Polycythemia requiring intermittent phlebotomy   • Thrombocytosis   • Leukopenia   • Myelofibrosis   • SBO status post laparotomy and lysis of adhesions 6/30/17   • Postop Acute on chronic renal failure. Improving   • Postop Thrombocytopenia               Labs/Tests/Procedures/Meds       07/05/17 1311    Labs/Tests/Procedures/Meds    Labs/Tests Review Reviewed    Medication Review Reviewed, pertinent       Results from last 7 days  Lab Units 07/05/17  0603  07/04/17  0339   SODIUM mmol/L 140  --  135   POTASSIUM mmol/L 4.0  < > 3.5   CHLORIDE mmol/L 106  --  107   CO2 mmol/L 23.0  --  22.0   BUN mg/dL 37*  --  36*   CREATININE mg/dL 1.20  --  1.20   CALCIUM mg/dL 9.7  --  9.8   BILIRUBIN mg/dL  --   --  0.6   ALK PHOS U/L  --   --  41   ALT (SGPT) U/L  --   --  20   AST (SGOT) U/L  --   --  16   GLUCOSE mg/dL 117*  --  145*   < > = values in this interval not displayed.           Physical Findings       07/05/17 1311    Physical Findings/Assessment    Additional Documentation --   pt sitting up in chair, ngt  out, pt reports tolerating clear liquids, wants to advance diet,  had bm today, per RN: pt still has hiccups, walking laps in hallway    Physical Appearance    Skin surgical wound              Nutrition Prescription Ordered       07/05/17 1314    Nutrition Prescription PO    Current PO Diet Clear Liquid    Nutrition Prescription PN    PN Route PICC    PN Goal Rate (mL/hr) 80 mL/hr    PN Current  Rate (mL/hr) 80 mL/hr    PN Goal Volume (mL) 1920 mL    Dextrose Concentration (%) 25 %    Amino Acid Concentration (%) 7 %            Evaluation of Received Nutrient/Fluid Intake       07/05/17 1314    Evaluation of Received Nutrient/Fluid Intake    Number of Days Evaluated 1 day    Calorie Intake Evaluation    Parenteral Calories (kcal) 2135    Total Calories (kcal) 2135    % Kcal Needs 100    Protein Intake Evaluation    Parenteral Protein (gm) 132    Total Protein (gm) 132    % Protein Needs 105    PO Evaluation    Number of Meals 3    % PO Intake 83              Problem/Interventions:        Problem 1       07/05/17 1311    Nutrition Diagnoses Problem 1    Problem 1 Needs Alternate Route    Gastrointestinal SBO    Signs/Symptoms (evidenced by) Clear Liquid Diet                    Intervention Goal       07/05/17 1316    Intervention Goal    General Nutrition support treatment    PO Establish PO    TF/PN Maintain TF/PN            Nutrition Intervention       07/05/17 1316    Nutrition Intervention    RD/Tech Action Interview for preference;Supplement provided;Follow Tx progress    Recommended/Ordered Diet            Nutrition Prescription       07/05/17 1316    Nutrition Prescription PO    PO Prescription Begin/change supplement    Supplement Clear Liquid Supplement    Supplement Frequency 3 times a day    Nutrition Prescription PN    Parenteral Prescription Continue same protocol            Education/Evaluation       07/05/17 1316    Monitor/Evaluation    Monitor Per protocol;I&O;PO intake;Pertinent labs;PN delivery/tolerance;Skin status;Symptoms          Electronically signed by:  Irish Salcedo RD  07/05/17 1:17 PM

## 2017-07-05 NOTE — PROGRESS NOTES
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 CT scan revealed a high grade SBO w/ portal venous gas. Patient went to the operating room with Dr. Serrano where she found \"dilated proximal small bowel showing signs of early ischemia, one adhesial band RLQ, bowel viable after band released and untwisted, proximal small bowel filled with stool, no small bowel masses identified, colon normal throughout\". Patient was hemodynamically stable postoperatively after arrival in the intensive care unit on minimal O2 w/ NGT to LWS.     Interval History:   Resting comfortably in chair, NGT out, has been ambulating in halls    History taken from:   Patient/staff/chart    PMH/FH/Social History were reviewed and updated appropriately in the electronic medical record.     Review of Systems:    Review of 14 systems was completed with positives and pertinent negatives noted in the subjective section.  All other systems reviewed and are negative.         Objective     Vital Signs  Temp:  [97.4 °F (36.3 °C)-98.1 °F (36.7 °C)] 97.4 °F (36.3 °C)  Heart Rate:  [63-90] 79  Resp:  [18-20] 18  BP: ()/() 122/59  07/04 0701 - 07/05 0700  In: 2550.2 [I.V.:119.7]  Out: 1745 [Urine:1745]  Body mass index is 25.09 kg/(m^2).     Physical Exam:     Constitutional:    Awake and alert, cooperative, in no acute distress   Head:    Normocephalic, without obvious abnormality, atraumatic   Eyes:            Lids and lashes normal, conjunctivae and sclerae normal, no   icterus, no pallor, corneas clear, PER   ENMT:   Ears appear intact with no abnormalities noted      No oral lesions, no thrush, oral " mucosa moist   Neck:   No adenopathy, supple, trachea midline, no thyromegaly, no JVD       Lungs/Resp:     Normal effort, symmetric chest rise, no crepitus, clear to      auscultation bilaterally, no chest wall tenderness                Heart/CV:    Regular rhythm and normal rate, normal S1 and S2, no            murmur   Abdomen/GI:     Diminished bowel sounds, no masses, no organomegaly, soft,        midline dressing intact, non-tender, non-distended   :     Deferred   Extremities/MSK:   No clubbing or cyanosis.  No edema.  Normal tone.  No deformities.    Pulses:   Pulses palpable and equal bilaterally   Skin:   No bleeding, bruising or rash   Heme/Lymph:   No cervical or supraclavicular adenopathy.    Neurologic:    Psychiatric:       Moves all extremities with no obvious focal motor deficit.  Cranial nerves 2 - 12 grossly intact   Oriented x 3, normal affect.             Results Review:     I reviewed the patient's new clinical results.     Results from last 7 days  Lab Units 07/05/17  0603 07/04/17  1224 07/04/17  0339 07/03/17  0408 07/02/17  0329   SODIUM mmol/L 140  --  135 139  139 137  137   POTASSIUM mmol/L 4.0 3.9 3.5 4.1  4.1 3.7  3.7   CHLORIDE mmol/L 106  --  107 105  105 103  103   CO2 mmol/L 23.0  --  22.0 27.0  27.0 28.0  28.0   BUN mg/dL 37*  --  36* 36*  36* 42*  42*   CREATININE mg/dL 1.20  --  1.20 1.40*  1.40* 1.90*  1.90*   CALCIUM mg/dL 9.7  --  9.8 9.8  9.8 9.0  9.0   BILIRUBIN mg/dL  --   --  0.6 0.7  0.7 0.7  0.7   ALK PHOS U/L  --   --  41 32  32 32  32   ALT (SGPT) U/L  --   --  20 22  22 20  20   AST (SGOT) U/L  --   --  16 22  22 29  29   GLUCOSE mg/dL 117*  --  145* 120*  120* 117*  117*       Results from last 7 days  Lab Units 07/05/17  0603 07/04/17  0339 07/03/17  0408  07/01/17  0348 06/30/17  0936   WBC 10*3/mm3 31.58* 15.91* 15.68*  < > 60.03*  63.02* 59.71*   HEMOGLOBIN g/dL 12.4* 11.8* 11.8*  < > 12.9*  12.4* 16.4   HEMATOCRIT % 36.8* 35.2* 34.8*   < > 39.5  36.8* 47.0   PLATELETS 10*3/mm3 55* 46* 50*  < > 84*  75* 142*   MONOCYTES % % 3.0  --   --   --  1.0 6.0   < > = values in this interval not displayed.        Results from last 7 days  Lab Units 07/05/17  0603 07/04/17  0339 07/03/17  0408   MAGNESIUM mg/dL 1.7 1.7 1.6  1.6   PHOSPHORUS mg/dL 2.8 2.5 2.4  2.4       I reviewed the patient's new imaging including images and reports.    Medication Review:     fondaparinux 2.5 mg Subcutaneous Q24H   insulin regular 0-7 Units Subcutaneous Q6H   pantoprazole 40 mg Intravenous Q AM   piperacillin-tazobactam 3.375 g Intravenous Q6H   sennosides 10 mL Oral BID       Adult Central 2-in-1 TPN  Last Rate: 80 mL/hr at 07/04/17 1801   Adult Central 2-in-1 TPN     Pharmacy to Dose TPN         Assessment/Plan     Principal Problem:    SBO status post laparotomy and lysis of adhesions 6/30/17  Active Problems:    Polycythemia requiring intermittent phlebotomy    Myelofibrosis    Postop Acute on chronic renal failure. Improving    Postop Thrombocytopenia    87-year-old with bowel obstruction with evidence of early ischemia on laparotomy with lysis of adhesion and not requiring resection.  Currently stable in intensive care unit.  Urine output improving.  Patient has significant leukocytosis which is improving.    Plan:  -TPN / Diet per surgery  -d/c'd right IJ soriano  -PICC  -Cr back to baseline, d/c'd additional IVF  -continue abx for now, monitor fluctuating wbc count  -monitor thrombocytopenia  -SCDs/TEDs/arixtra/PPI  -to floor today    Star Razo MD  07/05/17  1:12 PM

## 2017-07-05 NOTE — PROGRESS NOTES
" LOS: 5 days   Patient Care Team:  Andrea Murguia MD as PCP - General  Andrea Murguia MD as PCP - Family Medicine      Subjective: alert, no nausea with NG clamped and scant residual, + flatus        Objective     Vital Signs  Blood pressure 112/60, pulse 76, temperature 98.1 °F (36.7 °C), temperature source Oral, resp. rate 20, height 72\" (182.9 cm), weight 185 lb (83.9 kg), SpO2 97 %.    Physical Exam:   Soft, less distended, + BS, inc c/d/i     Results Review:     I reviewed the patient's new clinical results.    Medication Review:  The patient's medications were reviewed and up to date      Assessment/Plan   SBO s/p lysis and now with some return of bowel function; d/c NG, clear liquids, ok for pt to go to telemetry from my standpt.  Hopefully able to adv diet tomorrow and wean TPN to off.  Overall pt doing very well.      Principal Problem:    SBO status post laparotomy and lysis of adhesions 6/30/17  Active Problems:    Polycythemia requiring intermittent phlebotomy    Myelofibrosis    Postop Acute on chronic renal failure. Improving    Postop Thrombocytopenia        Plan for disposition:Where: home    Elena Serrano MD  07/05/17  11:03 AM      "

## 2017-07-06 NOTE — PROGRESS NOTES
" LOS: 6 days   Patient Care Team:  Andrea Murguia MD as PCP - General  Andrea Murguia MD as PCP - Family Medicine      Subjective: Feels good, no nausea, tolerating liquids, BM yesterday, no pain, Hiccups better    Objective     Vital Signs  Blood pressure 134/63, pulse 73, temperature 97.7 °F (36.5 °C), temperature source Oral, resp. rate 18, height 72\" (182.9 cm), weight 185 lb (83.9 kg), SpO2 100 %.    Physical Exam:   Alert, abd soft, nontender, inc C/D/I, hypoactive BS     Results Review:     I reviewed the patient's new clinical results.    Medication Review:  The patient's medications were reviewed and up to date      Assessment/Plan    1. SBO s/p lysis: return of bowel function, adv diet and wean TPN  2. Leukocytosis: at this point I think this is related to his polycythemia and not infectious, recheck in am  3. Thrombocytopenia with no evidence of bleeding  4. Renal function at baseline  5.  Overall pt is doing well.  If tolerates diet then possible d/c tomorrow.    Principal Problem:    SBO status post laparotomy and lysis of adhesions 6/30/17  Active Problems:    Polycythemia requiring intermittent phlebotomy    Myelofibrosis    Postop Acute on chronic renal failure. Improving    Postop Thrombocytopenia        Plan for disposition:Where: home    Elena Serrano MD  07/06/17  8:03 AM      "

## 2017-07-06 NOTE — PROGRESS NOTES
Continued Stay Note  [facility]     Patient Name: Neil Houser  MRN: [id number]  Today's Date: 7/6/2017    Admit Date: 6/30/2017          Discharge Plan       07/06/17 1149    Case Management/Social Work Plan    Plan Home    Patient/Family In Agreement With Plan yes    Additional Comments I spoke with patient to let him know his family (sister in law)  had contacted [employer]. He is good with this for discharge.  I left my card for his associate, Nicholas to call me if needed.     Order placed and called to Monica Ayala RN with [employer].               Discharge Codes     None        Expected Discharge Date and Time     Expected Discharge Date Expected Discharge Time    Jul 7, 2017             Jaleesa Manjarrez RN

## 2017-07-06 NOTE — PLAN OF CARE
Problem: Patient Care Overview (Adult)  Goal: Plan of Care Review  Outcome: Ongoing (interventions implemented as appropriate)    07/06/17 2439   Coping/Psychosocial Response Interventions   Plan Of Care Reviewed With patient   Patient Care Overview   Progress improving   Outcome Evaluation   Outcome Summary/Follow up Plan No c/o pain, abd distended, normoactive bowel sounds. AO x4, VSS, ambulating in room.        Goal: Adult Individualization and Mutuality  Outcome: Ongoing (interventions implemented as appropriate)  Goal: Discharge Needs Assessment  Outcome: Ongoing (interventions implemented as appropriate)    Problem: Perioperative Period (Adult)  Goal: Signs and Symptoms of Listed Potential Problems Will be Absent or Manageable (Perioperative Period)  Outcome: Ongoing (interventions implemented as appropriate)    Problem: Pressure Ulcer Risk (Jhonny Scale) (Adult,Obstetrics,Pediatric)  Goal: Skin Integrity  Outcome: Ongoing (interventions implemented as appropriate)    Problem: Bowel Obstruction (Adult)  Goal: Signs and Symptoms of Listed Potential Problems Will be Absent or Manageable (Bowel Obstruction)  Outcome: Ongoing (interventions implemented as appropriate)

## 2017-07-07 NOTE — PLAN OF CARE
Problem: Patient Care Overview (Adult)  Goal: Plan of Care Review  Outcome: Ongoing (interventions implemented as appropriate)    07/06/17 1619 07/06/17 2000   Coping/Psychosocial Response Interventions   Plan Of Care Reviewed With --  patient   Patient Care Overview   Progress improving --          07/06/17 1619 07/06/17 2000   Coping/Psychosocial Response Interventions   Plan Of Care Reviewed With --  patient   Patient Care Overview   Progress improving --    Outcome Evaluation   Outcome Summary/Follow up Plan No c/o pain, abd distended, normoactive bowel sounds. AO x4, VSS, ambulating in room. Having stools.  TPN discontinued.  FSBS to continue through 1200 today.EVERTON PICC. [facility] --        Goal: Discharge Needs Assessment  Outcome: Ongoing (interventions implemented as appropriate)

## 2017-07-07 NOTE — DISCHARGE SUMMARY
"  Date of Discharge:  7/7/2017    Discharge Diagnosis: Small bowel obstruction    Presenting Problem/History of Present Illness  SBO (small bowel obstruction) [K56.69]     Hospital Course  Patient is a 87 y.o. male presented with small bowel obstruction patient was septic and he was hypotensive and tachycardic he had pneumatosis of the distal small bowel and portal venous gas throughout the liver is white blood cell count was 59,000 he was mottled and I was very concerned that he would survive without surgery and possibly not survive with the surgery I recommended emergency expiratory laparotomy with possible bowel resection possible ostomy patient eventually agreed to undergo surgery at time of exploration patient was found to have one adhesial band causing the obstruction once the bowel was no longer twisted and the adhesive band was released the bowel was viable and he required no resection. During the operation he stabilized and left the operating room on no pressors and was making urine.  He was managed in the intensive care unit for the majority of his stay has remained stable throughout his hospitalization.  He had slow return of bowel function but approximately 2 days ago I was able to remove his nasogastric tube and he was transferred to the floor 2 days ago. He is now tolerating a regular diet and having normal bowel movements. He is ready for discharge.      Procedures Performed  Procedure(s):  LAPAROTOMY EXPLORATORY       Consults:   Consults     Date and Time Order Name Status Description    6/30/2017 1457 Inpatient Consult to General Surgery      6/30/2017 1425 IP General Consult (Use specialty-specific consult if known)            Pertinent Test Results: WBC 15-63, now in 40's    Condition on Discharge:  good    Vital Signs  Blood pressure 120/60, pulse 86, temperature 97.5 °F (36.4 °C), temperature source Oral, resp. rate 18, height 72\" (182.9 cm), weight 185 lb (83.9 kg), SpO2 100 %.    Physical " Exam:     General Appearance:    Alert, cooperative, in no acute distress   Head:    Normocephalic, without obvious abnormality, atraumatic   Eyes:            Lids and lashes normal, conjunctivae and sclerae normal, no   icterus, no pallor, corneas clear, PERRLA   Ears:    Ears appear intact with no abnormalities noted   Throat:   No oral lesions, no thrush, oral mucosa moist   Neck:   No adenopathy, supple, trachea midline, no thyromegaly, no   carotid bruit, no JVD   Back:     No kyphosis present, no scoliosis present, no skin lesions,      erythema or scars, no tenderness to percussion or                   palpation,   range of motion normal   Lungs:     Clear to auscultation,respirations regular, even and                  unlabored    Heart:    Regular rhythm and normal rate, normal S1 and S2, no            murmur, no gallop, no rub, no click   Chest Wall:    No abnormalities observed   Abdomen:     Normal bowel sounds, no masses, no organomegaly, soft        non-tender, non-distended, no guarding, no rebound                Tenderness, inc c/d/i   Rectal:     Deferred   Extremities:   Moves all extremities well, no edema, no cyanosis, no             redness   Pulses:   Pulses palpable and equal bilaterally   Skin:   No bleeding, bruising or rash   Lymph nodes:   No palpable adenopathy   Neurologic:   Cranial nerves 2 - 12 grossly intact, sensation intact, DTR       present and equal bilaterally       Discharge Disposition home      Discharge Medications   Neil Houser   Home Medication Instructions NEGRO:[id number]    Printed on:07/07/17 0751   Medication Information                      allopurinol (ZYLOPRIM) 300 MG tablet  TK 1 T PO QD             aspirin 81 MG EC tablet  Take 81 mg by mouth Daily.             Cholecalciferol (VITAMIN D3) 5000 UNITS capsule capsule  Take 5,000 Units by mouth Daily.             Cinnamon 500 MG capsule  Take 1,000 mg by mouth Daily.             clopidogrel (PLAVIX) 75 MG  tablet  TK 1 T PO D             coenzyme Q10 50 MG capsule capsule  Take 50 mg by mouth Daily.             danazol (DANOCRINE) 50 MG capsule  TK ONE C PO QD             esomeprazole (nexIUM) 40 MG capsule  TK 1 C PO QD             Multiple Vitamins-Minerals (MULTIVITAL PO)  Take  by mouth.             pioglitazone (ACTOS) 45 MG tablet  TK 1 T PO QAM             Probiotic Product (PROBIOTIC ADVANCED PO)  Take  by mouth Daily.             ramipril (ALTACE) 10 MG capsule  TK 1 C PO BID             rosuvastatin (CRESTOR) 10 MG tablet  TK 1 T PO QD             sucralfate (CARAFATE) 1 GM/10ML suspension  Take 1 g by mouth 4 (Four) Times a Day.             Turmeric 500 MG tablet  Take  by mouth Daily.                 Discharge Diet: gi soft    Activity at Discharge: no lifting >20lbs    Follow-up Appointments  Future Appointments  Date Time Provider Department Center   8/18/2017 10:45 AM Leny Thakur MD [facility]   7/11/2017 Zach Serrano MD  07/07/17  7:51 AM

## 2017-07-07 NOTE — PROGRESS NOTES
Adult Nutrition  Assessment/PES    Patient Name:  Neil Houser  YOB: 1929  MRN: [id number]  Admit Date:  6/30/2017    Assessment Date:  7/7/2017        Reason for Assessment       07/07/17 1010    Reason for Assessment    Reason For Assessment/Visit education;follow up protocol    Time Spent (min) 30    Diagnosis --   per notes this admission    Gastrointestinal --   Tolerating regular diet and normal BM's.                  Labs/Tests/Procedures/Meds       07/07/17 1011    Labs/Tests/Procedures/Meds    Labs/Tests Review Reviewed                Nutrition Prescription Ordered       07/07/17 1011    Nutrition Prescription PO    Current PO Diet Regular    Supplement Boost Breeze    Supplement Frequency 3 times a day    Common Modifiers GI Soft/[place]            Evaluation of Received Nutrient/Fluid Intake       07/07/17 1012    Evaluation of Received Nutrient/Fluid Intake    Number of Days Evaluated 2 days   Reflective of TPN delivery for 07/05/2017 and 07/06/2017    Nutrition Delivered Calorie Evaluation;Protein Evaluation    Calorie Intake Evaluation    Parenteral Calories (kcal) 1099.5    Total Calories (kcal) 1099.5    % Kcal Needs 55    Protein Intake Evaluation    Parenteral Protein (gm) 77    Total Protein (gm) 77    % Protein Needs 61    PO Evaluation    Number of Meals 1    % PO Intake 50              Problem/Interventions:        Problem 1       07/07/17 1015    Nutrition Diagnoses Problem 1    Resolved? Yes            Problem 2       07/07/17 1015    Nutrition Diagnoses Problem 2    Problem 2 Inadequate Intake/Infusion    Etiology (related to) Factors Affecting Nutrition    Appetite Good    Signs/Symptoms (evidenced by) PO Intake    Percent (%) intake recorded 50 %    Over number of meals 1                  Intervention Goal       07/07/17 1016    Intervention Goal    General Nutrition support treatment            Nutrition Intervention       07/07/17 1016    Nutrition Intervention     RD/Tech Action Menu provided;Follow Tx progress   Patient advised RD his cook will prepare his foods.  Advised pt. to remain on low fiber diet until surgeon advises advance to regular foods.              Education/Evaluation       07/07/17 1017    Education    Education --   MNT discussed with pt.    Monitor/Evaluation    Monitor Per protocol            Electronically signed by:  Jeannine Nunn RD  07/07/17 10:17 AM

## 2017-07-07 NOTE — PROGRESS NOTES
" LOS: 7 days   Patient Care Team:  Andrea Murguia MD as PCP - General  Andrea Murguia MD as PCP - Family Medicine      Subjective: Pt feels great.  He is tolerating a regular diet and having normal BM's.  No nausea.  No fever.        Objective     Vital Signs  Blood pressure 120/60, pulse 86, temperature 97.5 °F (36.4 °C), temperature source Oral, resp. rate 18, height 72\" (182.9 cm), weight 185 lb (83.9 kg), SpO2 100 %.    Physical Exam:   Alert, soft, no tenderness, inc c/d/i  Results Review:     I reviewed the patient's new clinical results.        Assessment/Plan    1. SBO: resolved after lysis, now normal bowel function; d/c home  2. REnal : normal  3. Myelofibrosis/polycythemia chronic conditions  4. Elevated WBC: not infectious secondary to 3  5. Thrombocytopenia: improving and should continue to improve    Pt is off TPN and stable.  D/c home.    Principal Problem:    SBO status post laparotomy and lysis of adhesions 6/30/17  Active Problems:    Polycythemia requiring intermittent phlebotomy    Myelofibrosis    Postop Acute on chronic renal failure. Improving    Postop Thrombocytopenia        Plan for disposition:Where: home    Elena Serrano MD  07/07/17  8:05 AM      "

## 2017-07-07 NOTE — PROGRESS NOTES
Continued Stay Note  [facility]     Patient Name: Neil Houser  MRN: [id number]  Today's Date: 7/7/2017    Admit Date: 6/30/2017          Discharge Plan       07/07/17 1032    Case Management/Social Work Plan    Plan Home    Patient/Family In Agreement With Plan yes    Additional Comments antiicpate discharge to home. Have notified Monica Ayala RN with [employer] of pending discharge today.               Discharge Codes     None        Expected Discharge Date and Time     Expected Discharge Date Expected Discharge Time    Jul 7, 2017             Jaleesa Manjarrez RN

## 2017-07-14 NOTE — TELEPHONE ENCOUNTER
Advised Don that I have contacted [facility] to check on status of the slides. They informed me that they need a copy of the patient's 's license and an authorization for us to request the slides.  Will fax authorization form to Don @ [email or web address] and he will bring it in to office with DL copy on Monday. Once received, will send another request to [facility] for slides.

## 2017-07-14 NOTE — TELEPHONE ENCOUNTER
----- Message from Holly Carrillo sent at 7/14/2017  1:56 PM EDT -----  Regarding: ELIDA-SLIDES  Contact: [phone]  Don called Dr. Thakur visited patient when he was in the ICU and Dr. Thakur said he didn't the slides from [facility] and he said Lorie was working on this just wondering what can be done to get these?

## 2017-07-17 NOTE — TELEPHONE ENCOUNTER
----- Message from Chrissie Bishop sent at 7/17/2017  8:57 AM EDT -----  Regarding: badin testosterone  Contact: [phone]  Nicholas called and ask if this patient can start back on his   Testosterone.  His level is 54.  Please call.

## 2017-07-22 NOTE — ED PROVIDER NOTES
Subjective   HPI Comments: Neil Houser is a 87 y.o.male who presents to the ED for a catheter replacement. For the past month, the pt has developed urinary retention, and he has required catherization to relieve the bladder. Yesterday, he had his catheter removed. Last night he was only had slight urine output, although he felt as if his bladder was full. Due to continued sx, his PCP, Dr. Murguia recommended that he visit the ED. At the ED, the pt denies recent UTI or any other acute sx.    Patient is a 87 y.o. male presenting with general illness.   History provided by:  Patient  Illness   Location:  Abdomen  Quality:  Urinary retention  Onset quality:  Gradual  Timing:  Constant  Progression:  Worsening  Relieved by:  None tried  Worsened by:  Nothing  Ineffective treatments:  None tried      Review of Systems   Gastrointestinal: Positive for abdominal distention.   Genitourinary: Positive for difficulty urinating.        No UTI documented, per test several days ago       Past Medical History:   Diagnosis Date   • B12 deficiency    • CKD (chronic kidney disease), stage III    • Esophagitis    • HLD (hyperlipidemia)    • MDS (myelodysplastic syndrome)    • Mild cognitive impairment    • Myelofibrosis    • ROBERTO (obstructive sleep apnea)    • Polycythemia vera    • PUD (peptic ulcer disease)    • TIA (transient ischemic attack)        No Known Allergies    Past Surgical History:   Procedure Laterality Date   • EXPLORATORY LAPAROTOMY N/A 6/30/2017    Procedure: LAPAROTOMY EXPLORATORY;  Surgeon: Elena Serrano MD;  Location: [facility];  Service:        Family History   Problem Relation Age of Onset   • Alzheimer's disease Sister    • Heart disease Brother    • Cancer Brother        Social History     Social History   • Marital status:      Spouse name: N/A   • Number of children: N/A   • Years of education: N/A     Social History Main Topics   • Smoking status: Never Smoker   • Smokeless tobacco: Never Used    • Alcohol use Yes      Comment: occasional alcohol use   • Drug use: No   • Sexual activity: Defer     Other Topics Concern   • None     Social History Narrative   • None         Objective   Physical Exam   Constitutional: He is oriented to person, place, and time. He appears well-developed and well-nourished. No distress.   HENT:   Head: Normocephalic and atraumatic.   Mouth/Throat: No oropharyngeal exudate.   Airway patent   Eyes: Conjunctivae are normal. No scleral icterus.   Neck: Normal range of motion. Neck supple.   Pulmonary/Chest: No respiratory distress.   Abdominal: Soft. Bowel sounds are normal. He exhibits distension. There is no rebound and no guarding.   Distended lower abdomen. Could palpate the bladder to just above the umbilicus.   Musculoskeletal: Normal range of motion. He exhibits no edema.   Neurological: He is alert and oriented to person, place, and time.   Skin: Skin is warm and dry. He is not diaphoretic.   Psychiatric: He has a normal mood and affect. His behavior is normal.   Nursing note and vitals reviewed.      Procedures         ED Course  ED Course   Comment By Time   Urine was slightly cloudly.  Dipstick testing in ER revealed trace LE, neg nitrites.  Will culture, treat for UTI. Lon Estrada MD 07/22 2507   With review of chart before signing, I realized that I left off the primary diagnosis of urinary retention and added that just now.  That does not result in any change to recommendations for care. Lon Estrada MD 07/22 5401     No results found for this or any previous visit (from the past 24 hour(s)).  Note: In addition to lab results from this visit, the labs listed above may include labs taken at another facility or during a different encounter within the last 24 hours. Please correlate lab times with ED admission and discharge times for further clarification of the services performed during this visit.    No orders to display     Vitals:    07/22/17 0929 07/22/17  "1122   BP: 135/76 121/74   Pulse: 85 74   Resp: 16 16   Temp: 97.7 °F (36.5 °C)    TempSrc: Oral    SpO2: 100% 100%   Weight: 180 lb (81.6 kg)    Height: 70\" (177.8 cm)      Medications   lidocaine (XYLOCAINE) 2 % jelly (1 mL Topical Given 7/22/17 0955)     ECG/EMG Results (last 24 hours)     ** No results found for the last 24 hours. **                        [facility]    Final diagnoses:   Pyuria   Urinary retention       Documentation assistance provided by ying Clark.  Information recorded by the ying was done at my direction and has been verified and validated by me.     Lopez Clark  07/22/17 0950       Lon Estrada MD  07/22/17 0689    "

## 2017-07-22 NOTE — DISCHARGE INSTRUCTIONS
Start the antibiotic today.  Ask Dr. Murguia to check the urine culture result in 3 days or have Dr. Manning do so.

## 2017-08-14 NOTE — TELEPHONE ENCOUNTER
IAM left advising that our pathology dept was in agreement with the diagnosis from [facility]. To call back if any further questions

## 2017-08-14 NOTE — TELEPHONE ENCOUNTER
----- Message from Johnny Floyd sent at 8/14/2017  9:17 AM EDT -----  Regarding: BADIN/SLIDES SENT TO [facility]  Contact: [phone]  EUGENIO CALLED ASKING FOR LYLY TO CALL HIM BACK HE WANTED TO KNOW IF YOU ALL HAVE RECEIVED ANY FEEDBACK ON THE SLIDES THAT WERE SENT OFF HE CAN BE REACHED AT [phone]

## 2017-08-16 NOTE — TELEPHONE ENCOUNTER
Advised that  pathology read his bm bx as myelofibrosis.   Scheduled for f/u with Dr Thakur for Monday @ 1015 per request to discuss

## 2017-08-16 NOTE — TELEPHONE ENCOUNTER
----- Message from Holly Carrillo sent at 8/16/2017  9:54 AM EDT -----  Regarding: ELIDA-PHONE CALL   Contact: [phone]  Nicholas called back Lorie had left a message to call back with any questions and he does about the test results from [facility].

## 2017-08-18 NOTE — TELEPHONE ENCOUNTER
Talked with Nicholas patient's assitant and reported that our Pathology agreed with [facility]'s diagnosis, and this all can be discussed at FU visit on Monday 2/21/17 with Dr Thakur

## 2017-08-18 NOTE — TELEPHONE ENCOUNTER
----- Message from Holly ANTONIO Carrillo sent at 8/18/2017  2:56 PM EDT -----  Regarding: ELIDA-PATHOLOGY REPORTS  Contact: [phone]  Nicholas called patient wants to know if the pathology reports that Dr. Thakur has have been compared to the ones done at [facility]?

## 2017-08-21 NOTE — PROGRESS NOTES
DATE OF VISIT: 8/21/2017     REASON FOR VISIT: Follow-up for MF post polycythemia vera.      HISTORY OF PRESENT ILLNESS: The patient is a very pleasant 86-year-old  gentleman with past medical history significant for polycythemia vera, JAK2  positive. The patient has been receiving phlebotomy periodically; currently he  is on once every 3 months targeting hematocrit less than 45.  The patient presented with fatigue and weakness found to have new onset anemia with leukocytosis.  Bone marrow biopsy done December 12, 2016 came back consistent with myelofibrosis.  The patient is  here today for scheduled follow-up visit.     SUBJECTIVE: The patient is feeling really well.  He denied any fatigue no fever chills denied any weight loss his energy as well as appetite had improved.  He denied any headaches, denied any night sweats, no diarrhea.       PAST MEDICAL HISTORY/SOCIAL HISTORY/FAMILY HISTORY: Unchanged from my prior  documentation done 02/06/2012.     Review of Systems   Constitutional: Positive for fatigue. Negative for activity change, appetite change, chills, fever and unexpected weight change.   HENT: Negative for hearing loss, mouth sores, nosebleeds, sore throat and trouble swallowing.    Eyes: Negative for visual disturbance.   Respiratory: Negative for cough, chest tightness, shortness of breath and wheezing.    Cardiovascular: Negative for chest pain, palpitations and leg swelling.   Gastrointestinal: Negative for abdominal distention, abdominal pain, blood in stool, constipation, diarrhea, nausea, rectal pain and vomiting.   Endocrine: Negative for cold intolerance and heat intolerance.   Genitourinary: Negative for difficulty urinating, dysuria, frequency and urgency.   Musculoskeletal: Negative for arthralgias, back pain, gait problem, joint swelling and myalgias.   Skin: Negative for rash.   Neurological: Negative for dizziness, tremors, syncope, weakness, light-headedness, numbness and headaches.    Hematological: Negative for adenopathy. Does not bruise/bleed easily.   Psychiatric/Behavioral: Negative for confusion, sleep disturbance and suicidal ideas. The patient is not nervous/anxious.          Current Outpatient Prescriptions:   •  allopurinol (ZYLOPRIM) 300 MG tablet, TK 1 T PO QD, Disp: , Rfl: 3  •  aspirin 81 MG EC tablet, Take 81 mg by mouth Daily., Disp: , Rfl:   •  cefdinir (OMNICEF) 300 MG capsule, TK ONE C PO  BID FOR 10 DAYS, Disp: , Rfl: 0  •  Cholecalciferol (VITAMIN D3) 5000 UNITS capsule capsule, Take 5,000 Units by mouth Daily., Disp: , Rfl:   •  Cinnamon 500 MG capsule, Take 1,000 mg by mouth Daily., Disp: , Rfl:   •  ciprofloxacin (CIPRO) 500 MG tablet, TK 1 T PO  BID, Disp: , Rfl: 0  •  clopidogrel (PLAVIX) 75 MG tablet, TK 1 T PO D, Disp: , Rfl: 3  •  coenzyme Q10 50 MG capsule capsule, Take 50 mg by mouth Daily., Disp: , Rfl:   •  danazol (DANOCRINE) 50 MG capsule, TK ONE C PO QD, Disp: , Rfl: 0  •  esomeprazole (nexIUM) 40 MG capsule, TK 1 C PO QD, Disp: , Rfl: 5  •  finasteride (PROSCAR) 5 MG tablet, TK 1 T PO QD, Disp: , Rfl: 1  •  MethylPREDNISolone (MEDROL, SOY,) 4 MG tablet, TK UTD, Disp: , Rfl: 0  •  metroNIDAZOLE (FLAGYL) 250 MG tablet, , Disp: , Rfl: 0  •  Multiple Vitamins-Minerals (MULTIVITAL PO), Take  by mouth., Disp: , Rfl:   •  nitrofurantoin, macrocrystal-monohydrate, (MACROBID) 100 MG capsule, Take 1 capsule by mouth 2 (Two) Times a Day., Disp: 14 capsule, Rfl: 0  •  ondansetron ODT (ZOFRAN-ODT) 4 MG disintegrating tablet, DIS ONE T PO QID PRN, Disp: , Rfl: 0  •  oxyCODONE-acetaminophen (PERCOCET) 5-325 MG per tablet, , Disp: , Rfl: 0  •  pioglitazone (ACTOS) 45 MG tablet, TK 1 T PO QAM, Disp: , Rfl: 1  •  Probiotic Product (PROBIOTIC ADVANCED PO), Take  by mouth Daily., Disp: , Rfl:   •  ramipril (ALTACE) 10 MG capsule, TK 1 C PO BID, Disp: , Rfl: 3  •  rosuvastatin (CRESTOR) 10 MG tablet, TK 1 T PO QD, Disp: , Rfl: 1  •  sucralfate (CARAFATE) 1 GM/10ML suspension,  "Take 1 g by mouth 4 (Four) Times a Day., Disp: , Rfl:   •  tamsulosin (FLOMAX) 0.4 MG capsule 24 hr capsule, TK 1 C PO QD, Disp: , Rfl: 1  •  Turmeric 500 MG tablet, Take  by mouth Daily., Disp: , Rfl:     PHYSICAL EXAMINATION:   /60 Comment: LUE  Pulse 88  Temp 97.3 °F (36.3 °C) (Oral)   Resp 20  Ht 70\" (177.8 cm)  Wt 180 lb (81.6 kg)  BMI 25.83 kg/m2   ECOG Performance Status: 1 - Symptomatic but completely ambulatory  General Appearance:  alert, cooperative, no apparent distress and appears stated age   Neurologic/Psychiatric: A&O x 3, gait steady, appropriate affect, strength 5/5 in all muscle groups   HEENT:  Normocephalic, without obvious abnormality, mucous membranes moist   Neck: Supple, symmetrical, trachea midline, no adenopathy;  No thyromegaly, masses, or tenderness   Lungs:   Clear to auscultation bilaterally; respirations regular, even, and unlabored bilaterally   Heart:  Regular rate and rhythm, no murmurs appreciated   Abdomen:   Soft, non-tender, non-distended and no organomegaly   Lymph nodes: No cervical, supraclavicular, inguinal or axillary adenopathy noted   Extremities: Normal, atraumatic; no clubbing, cyanosis, or edema    Skin: No rashes, ulcers, or suspicious lesions noted     No visits with results within 2 Week(s) from this visit.  Latest known visit with results is:    Lab Requisition on 02/03/2017   Component Date Value Ref Range Status   • Case Report 02/03/2017    Final                    Value:Surgical Pathology Report                         Case: [id number]                                  Authorizing Provider:  Leny Thakur MD          Collected:           02/03/2017 11:58 AM          Pathologist:           Sloan Barker MD       Received:            08/01/2017 11:58 AM          Specimen:    Marrow, External Consult from Dr. Thakur on [facility] Case [id number] 9 slides         • Clinical Information 02/03/2017    Final                    Value:This result " contains rich text formatting which cannot be displayed here.   • Final Diagnosis 02/03/2017    Final                    Value:This result contains rich text formatting which cannot be displayed here.   • Comment 02/03/2017    Final                    Value:This result contains rich text formatting which cannot be displayed here.   • Gross Description 02/03/2017    Final                    Value:This result contains rich text formatting which cannot be displayed here.   • Microscopic Description 02/03/2017    Final                    Value:This result contains rich text formatting which cannot be displayed here.        Xr Chest Pa & Lateral    Result Date: 8/2/2017  Narrative: EXAMINATION: XR CHEST PA AND LATERAL-  INDICATION: D72.829-Elevated white blood cell count, unspecified.  COMPARISON: 07/05/2017.  FINDINGS: The cardiac silhouette is normal. There is no pulmonary inflammatory process. There is no mass or effusion.         Impression: No active disease.  D:  08/02/2017 E:  08/02/2017  This report was finalized on 8/2/2017 12:56 PM by Dr. Ottoniel Garcia MD.    Bone Marrow Aspiration & Exam   Order: [id number]   Status:  Final result Visible to patient:  No (Not Released) Dx:  Polycythemia      7d ago     Clinical Information       The working history is a history of ANASTACIO-2 positive polycythemia vera. The patient has been receiving therapeutic phlebotomy with q 3 month blood draws. The patient has not had phlebotomies for the past 4 months, but is anemic with a hemoglobin of 9.6, MCV is 89. He additionally was found to have leukocytosis with a white blood cell count of 15.5 including reported left shift and rare blasts. Bone marrow evaluation is now performed to evaluate for disease progression. This was performed by Dr. Thakur.   Final Diagnosis   PERIPHERAL BLOOD SMEAR, CBC DATA, BONE MARROW ASPIRATE, CLOT SECTION, AND BIOPSY WITH FLOW CYTOMETRY:  Hypercellular bone marrow with granulocytic hyperplasia and left  shift.  1-2+ reticulin fibrosis with foci of 3+ fibrosis  See COMMENT  EDS/klb   Electronically signed by Evette Gaines MD on 12/15/2016 at 1513   Comment       The findings in this case are most compatible with an evolving post-polycythemic myelofibrosis. A small population of abnormal monocytes was detected by flow, likely representing the immature monocytic cells seen in the aspirate smears. This atypical population is most consistent with clonal evolution of the patient's myeloid neoplasm.             ASSESSMENT: The patient is a very pleasant 86-year-old gentleman with  MF post polycythemia vera, JAK2 positive.      PROBLEM LIST:   1. 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  DIPSS score of 5.   2. Leukocytosis  3. Anemia, Normocytic.  4.  Hypogonadism     PLAN:   1.  I will continue danazol per [facility] as well as [facility] recommendation.    2.  Will monitor patient's CBC once a month  3.  Patient will come back and see me in 4 months.  4.  We'll continue aspirin daily  5.  We'll consider starting patient on Jakafi if needed.   6.  I asked the patient to go down on AndroGel from 3% to 2% and if he is still doing well we'll go down to the maintenance dose 1%.  Leny Thakur MD  8/21/2017

## 2017-09-08 NOTE — TELEPHONE ENCOUNTER
----- Message from Holly Carrillo sent at 9/8/2017  8:44 AM EDT -----  Regarding: ELIDA-LABS  Contact: [phone]  Nicholas called and he dropped off labs that were taking yesterday just wants  to look at those to make sure he can wait to get them drawn Wednesday at [facility]?

## 2017-09-08 NOTE — TELEPHONE ENCOUNTER
Advised that per dr. Thakur, patient can wait until Wednesday to get his labs redrawn while he is at [facility]

## 2017-12-18 NOTE — PROGRESS NOTES
DATE OF VISIT: 12/18/2017     REASON FOR VISIT: Follow-up for MF post polycythemia vera.      HISTORY OF PRESENT ILLNESS: The patient is a very pleasant 86-year-old  gentleman with past medical history significant for polycythemia vera, JAK2  positive. The patient has been receiving phlebotomy periodically; currently he  is on once every 3 months targeting hematocrit less than 45.  The patient presented with fatigue and weakness found to have new onset anemia with leukocytosis.  Bone marrow biopsy done December 12, 2016 came back consistent with myelofibrosis.  The patient is here today for scheduled follow-up visit.     SUBJECTIVE: The patient is feeling really well.  He denied any fatigue no fever chills denied any weight loss his energy as well as appetite had improved.  He denied any headaches, denied any night sweats, no diarrhea.       PAST MEDICAL HISTORY/SOCIAL HISTORY/FAMILY HISTORY: Unchanged from my prior  documentation done 02/06/2012.     Review of Systems   Constitutional: Positive for fatigue. Negative for activity change, appetite change, chills, fever and unexpected weight change.   HENT: Negative for hearing loss, mouth sores, nosebleeds, sore throat and trouble swallowing.    Eyes: Negative for visual disturbance.   Respiratory: Negative for cough, chest tightness, shortness of breath and wheezing.    Cardiovascular: Negative for chest pain, palpitations and leg swelling.   Gastrointestinal: Negative for abdominal distention, abdominal pain, blood in stool, constipation, diarrhea, nausea, rectal pain and vomiting.   Endocrine: Negative for cold intolerance and heat intolerance.   Genitourinary: Negative for difficulty urinating, dysuria, frequency and urgency.   Musculoskeletal: Negative for arthralgias, back pain, gait problem, joint swelling and myalgias.   Skin: Negative for rash.   Neurological: Negative for dizziness, tremors, syncope, weakness, light-headedness, numbness and headaches.    Hematological: Negative for adenopathy. Does not bruise/bleed easily.   Psychiatric/Behavioral: Negative for confusion, sleep disturbance and suicidal ideas. The patient is not nervous/anxious.          Current Outpatient Prescriptions:   •  allopurinol (ZYLOPRIM) 300 MG tablet, TK 1 T PO QD, Disp: , Rfl: 3  •  aspirin 81 MG EC tablet, Take 81 mg by mouth Daily., Disp: , Rfl:   •  cefdinir (OMNICEF) 300 MG capsule, TK ONE C PO  BID FOR 10 DAYS, Disp: , Rfl: 0  •  Cholecalciferol (VITAMIN D3) 5000 UNITS capsule capsule, Take 5,000 Units by mouth Daily., Disp: , Rfl:   •  Cinnamon 500 MG capsule, Take 1,000 mg by mouth Daily., Disp: , Rfl:   •  clopidogrel (PLAVIX) 75 MG tablet, TK 1 T PO D, Disp: , Rfl: 3  •  coenzyme Q10 50 MG capsule capsule, Take 50 mg by mouth Daily., Disp: , Rfl:   •  danazol (DANOCRINE) 50 MG capsule, TK ONE C PO QD, Disp: , Rfl: 0  •  esomeprazole (nexIUM) 40 MG capsule, TK 1 C PO QD, Disp: , Rfl: 5  •  Multiple Vitamins-Minerals (MULTIVITAL PO), Take  by mouth., Disp: , Rfl:   •  pioglitazone (ACTOS) 45 MG tablet, TK 1 T PO QAM, Disp: , Rfl: 1  •  Probiotic Product (PROBIOTIC ADVANCED PO), Take  by mouth Daily., Disp: , Rfl:   •  ramipril (ALTACE) 10 MG capsule, TK 1 C PO BID, Disp: , Rfl: 3  •  rosuvastatin (CRESTOR) 10 MG tablet, TK 1 T PO QD, Disp: , Rfl: 1  •  sucralfate (CARAFATE) 1 GM/10ML suspension, Take 1 g by mouth 4 (Four) Times a Day., Disp: , Rfl:   •  tamsulosin (FLOMAX) 0.4 MG capsule 24 hr capsule, TK 1 C PO QD, Disp: , Rfl: 1    PHYSICAL EXAMINATION:   /56  Pulse 68  Temp 97.8 °F (36.6 °C) (Temporal Artery )   Resp 13  Wt 87.1 kg (192 lb)  BMI 27.55 kg/m2   ECOG Performance Status: 1 - Symptomatic but completely ambulatory  General Appearance:  alert, cooperative, no apparent distress and appears stated age   Neurologic/Psychiatric: A&O x 3, gait steady, appropriate affect, strength 5/5 in all muscle groups   HEENT:  Normocephalic, without obvious abnormality,  mucous membranes moist   Neck: Supple, symmetrical, trachea midline, no adenopathy;  No thyromegaly, masses, or tenderness   Lungs:   Clear to auscultation bilaterally; respirations regular, even, and unlabored bilaterally   Heart:  Regular rate and rhythm, no murmurs appreciated   Abdomen:   Soft, non-tender, non-distended and no organomegaly   Lymph nodes: No cervical, supraclavicular, inguinal or axillary adenopathy noted   Extremities: Normal, atraumatic; no clubbing, cyanosis, or edema    Skin: No rashes, ulcers, or suspicious lesions noted     No visits with results within 2 Week(s) from this visit.  Latest known visit with results is:    Lab Requisition on 02/03/2017   Component Date Value Ref Range Status   • Case Report 02/03/2017    Final                    Value:Surgical Pathology Report                         Case: [id number]                                  Authorizing Provider:  Leny Thakur MD          Collected:           02/03/2017 11:58 AM          Pathologist:           Sloan Barker MD       Received:            08/01/2017 11:58 AM          Specimen:    Marrow, External Consult from Dr. Thakur on [facility] Case [id number] slides         • Clinical Information 02/03/2017    Final                    Value:This result contains rich text formatting which cannot be displayed here.   • Final Diagnosis 02/03/2017    Final                    Value:This result contains rich text formatting which cannot be displayed here.   • Comment 02/03/2017    Final                    Value:This result contains rich text formatting which cannot be displayed here.   • Gross Description 02/03/2017    Final                    Value:This result contains rich text formatting which cannot be displayed here.   • Microscopic Description 02/03/2017    Final                    Value:This result contains rich text formatting which cannot be displayed here.        No results found.Bone Marrow Aspiration & Exam   Order:  [id number]   Status:  Final result Visible to patient:  No (Not Released) Dx:  Polycythemia      7d ago     Clinical Information       The working history is a history of ANASTACIO-2 positive polycythemia vera. The patient has been receiving therapeutic phlebotomy with q 3 month blood draws. The patient has not had phlebotomies for the past 4 months, but is anemic with a hemoglobin of 9.6, MCV is 89. He additionally was found to have leukocytosis with a white blood cell count of 15.5 including reported left shift and rare blasts. Bone marrow evaluation is now performed to evaluate for disease progression. This was performed by Dr. Thakur.   Final Diagnosis   PERIPHERAL BLOOD SMEAR, CBC DATA, BONE MARROW ASPIRATE, CLOT SECTION, AND BIOPSY WITH FLOW CYTOMETRY:  Hypercellular bone marrow with granulocytic hyperplasia and left shift.  1-2+ reticulin fibrosis with foci of 3+ fibrosis  See COMMENT  EDS/klb   Electronically signed by Evette Gaines MD on 12/15/2016 at 1513   Comment       The findings in this case are most compatible with an evolving post-polycythemic myelofibrosis. A small population of abnormal monocytes was detected by flow, likely representing the immature monocytic cells seen in the aspirate smears. This atypical population is most consistent with clonal evolution of the patient's myeloid neoplasm.             ASSESSMENT: The patient is a very pleasant 86-year-old gentleman with  MF post polycythemia vera, JAK2 positive.      PROBLEM LIST:   1. 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  DIPSS score of 5.   2. Leukocytosis  3. Anemia, Normocytic.  4.  Hypogonadism     PLAN:   1. I reviewed the lab results from 12/7/2017, done through [facility]. His hemoglobin has improved to 11.8, with WBC 5.7.   2.  I will continue danazol per LOWELL Zurita as well as [facility] recommendation.    3.  Will monitor patient's CBC once a month  4.  Patient will come back and see me in 4  months.  5.  We'll continue aspirin daily  6.  We'll consider starting patient on Jakafi if needed.   7.  The patient is still on AndroGel for testosterone replacement.  His serum total and free testosterone levels were normal.    Scribed for Leny Thakur MD by PEPPER Mujica. 12/18/2017  12:06 PM  Leny Thakur MD  12/18/2017   I, Leny Thakur MD, personally performed the services described in this documentation as scribed by the above named individual in my presence, and it is both accurate and complete.  12/18/2017  12:06 PM

## 2018-01-01 ENCOUNTER — TRANSCRIBE ORDERS (OUTPATIENT)
Dept: ADMINISTRATIVE | Facility: HOSPITAL | Age: 83
End: 2018-01-01

## 2018-01-01 ENCOUNTER — TELEPHONE (OUTPATIENT)
Dept: ONCOLOGY | Facility: CLINIC | Age: 83
End: 2018-01-01

## 2018-01-01 ENCOUNTER — APPOINTMENT (OUTPATIENT)
Dept: ULTRASOUND IMAGING | Facility: HOSPITAL | Age: 83
End: 2018-01-01
Attending: INTERNAL MEDICINE

## 2018-01-01 ENCOUNTER — HOSPITAL ENCOUNTER (OUTPATIENT)
Dept: ULTRASOUND IMAGING | Facility: HOSPITAL | Age: 83
Discharge: HOME | End: 2018-02-22
Attending: INTERNAL MEDICINE

## 2018-01-01 ENCOUNTER — HOSPITAL ENCOUNTER (OUTPATIENT)
Dept: ULTRASOUND IMAGING | Facility: HOSPITAL | Age: 83
Discharge: HOME | End: 2018-02-22
Attending: INTERNAL MEDICINE | Admitting: INTERNAL MEDICINE

## 2018-01-01 DIAGNOSIS — N17.9 ACUTE RENAL FAILURE, UNSPECIFIED ACUTE RENAL FAILURE TYPE (HCC): Primary | ICD-10-CM

## 2018-01-01 DIAGNOSIS — N28.9 ACUTE KIDNEY INSUFFICIENCY: Primary | ICD-10-CM

## 2018-01-01 DIAGNOSIS — N17.9 ACUTE RENAL FAILURE, UNSPECIFIED ACUTE RENAL FAILURE TYPE (HCC): ICD-10-CM

## 2018-01-01 DIAGNOSIS — N28.9 ACUTE KIDNEY INSUFFICIENCY: ICD-10-CM

## 2018-01-01 PROCEDURE — 76857 US EXAM PELVIC LIMITED: CPT

## 2018-01-01 PROCEDURE — 76775 US EXAM ABDO BACK WALL LIM: CPT

## 2018-02-19 NOTE — TELEPHONE ENCOUNTER
----- Message from Isabel Najera sent at 2/19/2018 10:40 AM EST -----  Regarding: ELIDA - QUESTIONS/ADVICE  Contact: [phone]  EUGENIO ABIG, CALLED REGARDING LAB RESULTS AND HOW HE IS FEELING CURRENTLY.     HE WOULD LIKE TO SPEAK TO LYLY.

## 2018-02-19 NOTE — TELEPHONE ENCOUNTER
Pt beginning to have edema in legs and around eyes, seeing Dr Murguia today re increasing creatinine.  Nicholas han will bring by lab results today. Will call him back with any recommendations from Dr Thaukr once he looks over patient's labs
